# Patient Record
Sex: FEMALE | Race: WHITE | NOT HISPANIC OR LATINO | Employment: OTHER | URBAN - METROPOLITAN AREA
[De-identification: names, ages, dates, MRNs, and addresses within clinical notes are randomized per-mention and may not be internally consistent; named-entity substitution may affect disease eponyms.]

---

## 2017-03-17 DIAGNOSIS — Z13.820 SCREENING FOR OSTEOPOROSIS: ICD-10-CM

## 2017-03-17 DIAGNOSIS — Z12.31 ENCOUNTER FOR SCREENING MAMMOGRAM FOR BREAST CANCER: Primary | ICD-10-CM

## 2017-03-20 ENCOUNTER — APPOINTMENT (OUTPATIENT)
Dept: WOMENS IMAGING | Facility: HOSPITAL | Age: 77
End: 2017-03-20

## 2017-03-20 PROCEDURE — G0202 SCR MAMMO BI INCL CAD: HCPCS | Performed by: RADIOLOGY

## 2017-10-02 RX ORDER — ATORVASTATIN CALCIUM 20 MG/1
TABLET, FILM COATED ORAL
Qty: 90 TABLET | Refills: 1 | Status: SHIPPED | OUTPATIENT
Start: 2017-10-02 | End: 2019-11-14

## 2018-02-20 ENCOUNTER — TELEPHONE (OUTPATIENT)
Dept: INTERNAL MEDICINE | Facility: CLINIC | Age: 78
End: 2018-02-20

## 2018-02-20 RX ORDER — ATENOLOL 25 MG/1
25 TABLET ORAL DAILY
Qty: 90 TABLET | Refills: 0 | Status: SHIPPED | OUTPATIENT
Start: 2018-02-20 | End: 2018-05-29 | Stop reason: SDUPTHER

## 2018-02-20 NOTE — TELEPHONE ENCOUNTER
Last ov  12/13/16  No up coming appt  Ok to refill rx ?    ----- Message from Graciela Richards sent at 2/20/2018  3:18 PM EST -----  Contact: pt - Dr Kumar' pt - RE: Rx sent to [place]  Pt calling and would like a refill on Rx      atenolol (TENORMIN) 25 MG tablet 90 tablet       Sig - Route: Take 1 tablet by mouth Daily. - Oral      [facility], [place]  # [phone]    Pt # [phone]

## 2018-05-29 RX ORDER — ATENOLOL 25 MG/1
25 TABLET ORAL DAILY
Qty: 90 TABLET | Refills: 3 | Status: SHIPPED | OUTPATIENT
Start: 2018-05-29 | End: 2019-11-14 | Stop reason: SDUPTHER

## 2019-10-08 RX ORDER — ALENDRONATE SODIUM 70 MG/1
70 TABLET ORAL
Qty: 12 TABLET | Refills: 3 | Status: SHIPPED | OUTPATIENT
Start: 2019-10-08 | End: 2019-11-14 | Stop reason: SDUPTHER

## 2019-11-14 ENCOUNTER — OFFICE VISIT (OUTPATIENT)
Dept: FAMILY MEDICINE CLINIC | Facility: CLINIC | Age: 79
End: 2019-11-14

## 2019-11-14 VITALS
SYSTOLIC BLOOD PRESSURE: 122 MMHG | TEMPERATURE: 98 F | WEIGHT: 119.6 LBS | OXYGEN SATURATION: 96 % | DIASTOLIC BLOOD PRESSURE: 72 MMHG | HEART RATE: 85 BPM | HEIGHT: 62 IN | BODY MASS INDEX: 22.01 KG/M2

## 2019-11-14 DIAGNOSIS — L30.9 ECZEMA, UNSPECIFIED TYPE: ICD-10-CM

## 2019-11-14 DIAGNOSIS — M85.80 OSTEOPENIA, UNSPECIFIED LOCATION: ICD-10-CM

## 2019-11-14 DIAGNOSIS — Z79.899 ENCOUNTER FOR LONG-TERM (CURRENT) USE OF MEDICATIONS: ICD-10-CM

## 2019-11-14 DIAGNOSIS — I10 ESSENTIAL HYPERTENSION: Primary | ICD-10-CM

## 2019-11-14 PROCEDURE — 99214 OFFICE O/P EST MOD 30 MIN: CPT | Performed by: FAMILY MEDICINE

## 2019-11-14 RX ORDER — TRIAMCINOLONE ACETONIDE 1 MG/G
CREAM TOPICAL 2 TIMES DAILY
Qty: 45 G | Refills: 1 | Status: SHIPPED | OUTPATIENT
Start: 2019-11-14 | End: 2020-01-07 | Stop reason: SDUPTHER

## 2019-11-14 RX ORDER — ALENDRONATE SODIUM 70 MG/1
70 TABLET ORAL
Qty: 12 TABLET | Refills: 3 | Status: SHIPPED | OUTPATIENT
Start: 2019-11-14 | End: 2021-04-19 | Stop reason: SDUPTHER

## 2019-11-14 RX ORDER — ATENOLOL 25 MG/1
25 TABLET ORAL DAILY
Qty: 90 TABLET | Refills: 3 | Status: SHIPPED | OUTPATIENT
Start: 2019-11-14 | End: 2021-04-15 | Stop reason: SDUPTHER

## 2019-11-14 NOTE — PATIENT INSTRUCTIONS
I will order a DEXA scan after May 2020 when the patient has been back on Fosamax for a full year.   Any medication changes will be made based on lab results and will be called to the patient later this week.    Osteoporosis    Osteoporosis is thinning and loss of density in your bones. Osteoporosis makes bones more brittle and fragile and more likely to break (fracture). Over time, osteoporosis can cause your bones to become so weak that they fracture after a minor fall. Bones in the hip, wrist, and spine are most likely to fracture due to osteoporosis.  What are the causes?  The exact cause of this condition is not known.  What increases the risk?  You may be at greater risk for osteoporosis if you:  · Have a family history of the condition.  · Have poor nutrition.  · Use steroid medicines, such as prednisone.  · Are female.  · Are age 50 or older.  · Smoke or have a history of smoking.  · Are not physically active (are sedentary).  · Are white () or of  descent.  · Have a small body frame.  · Take certain medicines, such as antiseizure medicines.  What are the signs or symptoms?  A fracture might be the first sign of osteoporosis, especially if the fracture results from a fall or injury that usually would not cause a bone to break. Other signs and symptoms include:  · Pain in the neck or low back.  · Stooped posture.  · Loss of height.  How is this diagnosed?  This condition may be diagnosed based on:  · Your medical history.  · A physical exam.  · A bone mineral density test, also called a DXA or DEXA test (dual-energy X-ray absorptiometry test). This test uses X-rays to measure the amount of minerals in your bones.  How is this treated?  The goal of treatment is to strengthen your bones and lower your risk for a fracture. Treatment may involve:  · Making lifestyle changes, such as:  ? Including foods with more calcium and vitamin D in your diet.  ? Doing weight-bearing and muscle-strengthening  exercises.  ? Stopping tobacco use.  ? Limiting alcohol intake.  · Taking medicine to slow the process of bone loss or to increase bone density.  · Taking daily supplements of calcium and vitamin D.  · Taking hormone replacement medicines, such as estrogen for women and testosterone for men.  · Monitoring your levels of calcium and vitamin D.  Follow these instructions at home:    Activity  · Exercise as told by your health care provider. Ask your health care provider what exercises and activities are safe for you. You should do:  ? Exercises that make you work against gravity (weight-bearing exercises), such as luis eduardo chi, yoga, or walking.  ? Exercises to strengthen muscles, such as lifting weights.  Lifestyle  · Limit alcohol intake to no more than 1 drink a day for nonpregnant women and 2 drinks a day for men. One drink equals 12 oz of beer, 5 oz of wine, or 1½ oz of hard liquor.  · Do not use any products that contain nicotine or tobacco, such as cigarettes and e-cigarettes. If you need help quitting, ask your health care provider.  Preventing falls  · Use devices to help you move around (mobility aids) as needed, such as canes, walkers, scooters, or crutches.  · Keep rooms well-lit and clutter-free.  · Remove tripping hazards from walkways, including cords and throw rugs.  · Install grab bars in bathrooms and safety rails on stairs.  · Use rubber mats in the bathroom and other areas that are often wet or slippery.  · Wear closed-toe shoes that fit well and support your feet. Wear shoes that have rubber soles or low heels.  · Review your medicines with your health care provider. Some medicines can cause dizziness or changes in blood pressure, which can increase your risk of falling.  General instructions  · Include calcium and vitamin D in your diet. Calcium is important for bone health, and vitamin D helps your body to absorb calcium. Good sources of calcium and vitamin D include:  ? Certain fatty fish, such as  salmon and tuna.  ? Products that have calcium and vitamin D added to them (fortified products), such as fortified cereals.  ? Egg yolks.  ? Cheese.  ? Liver.  · Take over-the-counter and prescription medicines only as told by your health care provider.  · Keep all follow-up visits as told by your health care provider. This is important.  Contact a health care provider if:  · You have never been screened for osteoporosis and you are:  ? A woman who is age 65 or older.  ? A man who is age 70 or older.  Get help right away if:  · You fall or injure yourself.  Summary  · Osteoporosis is thinning and loss of density in your bones. This makes bones more brittle and fragile and more likely to break (fracture),even with minor falls.  · The goal of treatment is to strengthen your bones and reduce your risk for a fracture.  · Include calcium and vitamin D in your diet. Calcium is important for bone health, and vitamin D helps your body to absorb calcium.  · Talk with your health care provider about screening for osteoporosis if you are a woman who is age 65 or older, or a man who is age 70 or older.  This information is not intended to replace advice given to you by your health care provider. Make sure you discuss any questions you have with your health care provider.  Document Released: 09/27/2006 Document Revised: 10/12/2018 Document Reviewed: 10/12/2018  [employer] Interactive Patient Education © 2019 [employer]

## 2019-11-14 NOTE — PROGRESS NOTES
Subjective   Jennifer Sprague is a 79 y.o. female.     Chief Complaint   Patient presents with   • Hypertension   • Osteoporosis   • Eczema       Patient is here to follow-up on hypertension and osteopenia.  6 months ago we started the patient back on Fosamax weekly.  She states that she has not had any progression of back pain.  She is feeling pretty well.  Her home blood pressure readings have been good and she is compliant with medications.  She denies any side effects.  The patient also needs a refill on her steroid cream for her fingers.  She still gets eczema.  It helps greatly.         Review of Systems   Constitutional: Negative for activity change, chills, fatigue and fever.   HENT: Negative for hearing loss, swollen glands, tinnitus and trouble swallowing.    Eyes: Negative for pain and visual disturbance.   Respiratory: Negative for cough and shortness of breath.    Cardiovascular: Negative for chest pain, palpitations and leg swelling.   Gastrointestinal: Negative for diarrhea and nausea.   Endocrine: Negative for polydipsia and polyuria.   Genitourinary: Negative for difficulty urinating and urinary incontinence.   Musculoskeletal: Negative for arthralgias, gait problem and joint swelling.   Skin: Negative for rash.   Allergic/Immunologic: Negative for immunocompromised state.   Neurological: Negative for dizziness, light-headedness and headache.   Hematological: Negative for adenopathy. Does not bruise/bleed easily.   Psychiatric/Behavioral: Negative for dysphoric mood and sleep disturbance.       The following portions of the patient's history were reviewed and updated as appropriate: allergies, current medications, past family history, past medical history, past social history, past surgical history and problem list.    Past Medical History:   Diagnosis Date   • History of colon polyps    • Hyperlipidemia    • Hypertension    • Osteopenia        Past Surgical History:   Procedure Laterality Date   •  "BACK SURGERY  1980    Disc surgery lower back, 1984   • EXPLORATORY LAPAROTOMY W/ BOWEL RESECTION  05/11/2016    Exp. Lap for SBO - had SB resection, adhesiolysis       Family History   Problem Relation Age of Onset   • Lupus Mother        Social History     Socioeconomic History   • Marital status: Unknown     Spouse name: Not on file   • Number of children: Not on file   • Years of education: Not on file   • Highest education level: Not on file   Tobacco Use   • Smoking status: Former Smoker   Substance and Sexual Activity   • Alcohol use: Yes         Current Outpatient Medications:   •  alendronate (FOSAMAX) 70 MG tablet, Take 1 tablet by mouth Every 7 (Seven) Days., Disp: 12 tablet, Rfl: 3  •  atenolol (TENORMIN) 25 MG tablet, Take 1 tablet by mouth Daily., Disp: 90 tablet, Rfl: 3  •  Multiple Vitamins-Minerals (MULTIVITAMIN ADULT PO), Take  by mouth., Disp: , Rfl:   •  triamcinolone (KENALOG) 0.1 % cream, Apply  topically to the appropriate area as directed 2 (Two) Times a Day., Disp: 45 g, Rfl: 1    Objective     Vitals:    11/14/19 0952   BP: 122/72   Pulse: 85   Temp: 98 °F (36.7 °C)   SpO2: 96%   Weight: 54.3 kg (119 lb 9.6 oz)   Height: 157.5 cm (62\")       Body mass index is 21.88 kg/m².    No components found for: 2D    Physical Exam   Constitutional: She is oriented to person, place, and time. She appears well-developed and well-nourished.   HENT:   Head: Normocephalic and atraumatic.   Eyes: Conjunctivae are normal.   Neck: Normal range of motion. Neck supple.   Cardiovascular: Normal rate, regular rhythm, normal heart sounds and intact distal pulses.   Pulmonary/Chest: Effort normal and breath sounds normal.   Abdominal: Soft. Bowel sounds are normal.   Musculoskeletal: Normal range of motion. She exhibits no edema.   Neurological: She is alert and oriented to person, place, and time.   Skin: Skin is warm and dry. Capillary refill takes less than 2 seconds. No rash noted.   Psychiatric: She has a " normal mood and affect. Her behavior is normal. Judgment and thought content normal.   Nursing note and vitals reviewed.      Procedures    Assessment/Plan   Jennifer was seen today for hypertension, osteoporosis and eczema.    Diagnoses and all orders for this visit:    Essential hypertension  -     Comprehensive Metabolic Panel  -     atenolol (TENORMIN) 25 MG tablet; Take 1 tablet by mouth Daily.    Osteopenia, unspecified location  -     Comprehensive Metabolic Panel  -     alendronate (FOSAMAX) 70 MG tablet; Take 1 tablet by mouth Every 7 (Seven) Days.    Encounter for long-term (current) use of medications  -     CBC & Differential  -     Comprehensive Metabolic Panel  -     TSH    Eczema, unspecified type  -     triamcinolone (KENALOG) 0.1 % cream; Apply  topically to the appropriate area as directed 2 (Two) Times a Day.        Patient Instructions   I will order a DEXA scan after May 2020 when the patient has been back on Fosamax for a full year.   Any medication changes will be made based on lab results and will be called to the patient later this week.    Osteoporosis    Osteoporosis is thinning and loss of density in your bones. Osteoporosis makes bones more brittle and fragile and more likely to break (fracture). Over time, osteoporosis can cause your bones to become so weak that they fracture after a minor fall. Bones in the hip, wrist, and spine are most likely to fracture due to osteoporosis.  What are the causes?  The exact cause of this condition is not known.  What increases the risk?  You may be at greater risk for osteoporosis if you:  · Have a family history of the condition.  · Have poor nutrition.  · Use steroid medicines, such as prednisone.  · Are female.  · Are age 50 or older.  · Smoke or have a history of smoking.  · Are not physically active (are sedentary).  · Are white () or of  descent.  · Have a small body frame.  · Take certain medicines, such as antiseizure  medicines.  What are the signs or symptoms?  A fracture might be the first sign of osteoporosis, especially if the fracture results from a fall or injury that usually would not cause a bone to break. Other signs and symptoms include:  · Pain in the neck or low back.  · Stooped posture.  · Loss of height.  How is this diagnosed?  This condition may be diagnosed based on:  · Your medical history.  · A physical exam.  · A bone mineral density test, also called a DXA or DEXA test (dual-energy X-ray absorptiometry test). This test uses X-rays to measure the amount of minerals in your bones.  How is this treated?  The goal of treatment is to strengthen your bones and lower your risk for a fracture. Treatment may involve:  · Making lifestyle changes, such as:  ? Including foods with more calcium and vitamin D in your diet.  ? Doing weight-bearing and muscle-strengthening exercises.  ? Stopping tobacco use.  ? Limiting alcohol intake.  · Taking medicine to slow the process of bone loss or to increase bone density.  · Taking daily supplements of calcium and vitamin D.  · Taking hormone replacement medicines, such as estrogen for women and testosterone for men.  · Monitoring your levels of calcium and vitamin D.  Follow these instructions at home:    Activity  · Exercise as told by your health care provider. Ask your health care provider what exercises and activities are safe for you. You should do:  ? Exercises that make you work against gravity (weight-bearing exercises), such as luis eduardo chi, yoga, or walking.  ? Exercises to strengthen muscles, such as lifting weights.  Lifestyle  · Limit alcohol intake to no more than 1 drink a day for nonpregnant women and 2 drinks a day for men. One drink equals 12 oz of beer, 5 oz of wine, or 1½ oz of hard liquor.  · Do not use any products that contain nicotine or tobacco, such as cigarettes and e-cigarettes. If you need help quitting, ask your health care provider.  Preventing  falls  · Use devices to help you move around (mobility aids) as needed, such as canes, walkers, scooters, or crutches.  · Keep rooms well-lit and clutter-free.  · Remove tripping hazards from walkways, including cords and throw rugs.  · Install grab bars in bathrooms and safety rails on stairs.  · Use rubber mats in the bathroom and other areas that are often wet or slippery.  · Wear closed-toe shoes that fit well and support your feet. Wear shoes that have rubber soles or low heels.  · Review your medicines with your health care provider. Some medicines can cause dizziness or changes in blood pressure, which can increase your risk of falling.  General instructions  · Include calcium and vitamin D in your diet. Calcium is important for bone health, and vitamin D helps your body to absorb calcium. Good sources of calcium and vitamin D include:  ? Certain fatty fish, such as salmon and tuna.  ? Products that have calcium and vitamin D added to them (fortified products), such as fortified cereals.  ? Egg yolks.  ? Cheese.  ? Liver.  · Take over-the-counter and prescription medicines only as told by your health care provider.  · Keep all follow-up visits as told by your health care provider. This is important.  Contact a health care provider if:  · You have never been screened for osteoporosis and you are:  ? A woman who is age 65 or older.  ? A man who is age 70 or older.  Get help right away if:  · You fall or injure yourself.  Summary  · Osteoporosis is thinning and loss of density in your bones. This makes bones more brittle and fragile and more likely to break (fracture),even with minor falls.  · The goal of treatment is to strengthen your bones and reduce your risk for a fracture.  · Include calcium and vitamin D in your diet. Calcium is important for bone health, and vitamin D helps your body to absorb calcium.  · Talk with your health care provider about screening for osteoporosis if you are a woman who is age  65 or older, or a man who is age 70 or older.  This information is not intended to replace advice given to you by your health care provider. Make sure you discuss any questions you have with your health care provider.  Document Released: 09/27/2006 Document Revised: 10/12/2018 Document Reviewed: 10/12/2018  [employer] Interactive Patient Education © 2019 [employer] Inc.

## 2019-11-15 LAB
ALBUMIN SERPL-MCNC: 4.5 G/DL (ref 3.5–5.2)
ALBUMIN/GLOB SERPL: 2 G/DL
ALP SERPL-CCNC: 96 U/L (ref 39–117)
ALT SERPL-CCNC: 12 U/L (ref 1–33)
AST SERPL-CCNC: 17 U/L (ref 1–32)
BASOPHILS # BLD AUTO: 0.02 10*3/MM3 (ref 0–0.2)
BASOPHILS NFR BLD AUTO: 0.4 % (ref 0–1.5)
BILIRUB SERPL-MCNC: 0.5 MG/DL (ref 0.2–1.2)
BUN SERPL-MCNC: 14 MG/DL (ref 8–23)
BUN/CREAT SERPL: 17.1 (ref 7–25)
CALCIUM SERPL-MCNC: 9.4 MG/DL (ref 8.6–10.5)
CHLORIDE SERPL-SCNC: 101 MMOL/L (ref 98–107)
CO2 SERPL-SCNC: 26.4 MMOL/L (ref 22–29)
CREAT SERPL-MCNC: 0.82 MG/DL (ref 0.57–1)
EOSINOPHIL # BLD AUTO: 0.04 10*3/MM3 (ref 0–0.4)
EOSINOPHIL NFR BLD AUTO: 0.8 % (ref 0.3–6.2)
ERYTHROCYTE [DISTWIDTH] IN BLOOD BY AUTOMATED COUNT: 12.8 % (ref 12.3–15.4)
GLOBULIN SER CALC-MCNC: 2.2 GM/DL
GLUCOSE SERPL-MCNC: 97 MG/DL (ref 65–99)
HCT VFR BLD AUTO: 40.3 % (ref 34–46.6)
HGB BLD-MCNC: 13.7 G/DL (ref 12–15.9)
IMM GRANULOCYTES # BLD AUTO: 0.01 10*3/MM3 (ref 0–0.05)
IMM GRANULOCYTES NFR BLD AUTO: 0.2 % (ref 0–0.5)
LYMPHOCYTES # BLD AUTO: 2.26 10*3/MM3 (ref 0.7–3.1)
LYMPHOCYTES NFR BLD AUTO: 42.6 % (ref 19.6–45.3)
MCH RBC QN AUTO: 31.1 PG (ref 26.6–33)
MCHC RBC AUTO-ENTMCNC: 34 G/DL (ref 31.5–35.7)
MCV RBC AUTO: 91.4 FL (ref 79–97)
MONOCYTES # BLD AUTO: 0.42 10*3/MM3 (ref 0.1–0.9)
MONOCYTES NFR BLD AUTO: 7.9 % (ref 5–12)
NEUTROPHILS # BLD AUTO: 2.56 10*3/MM3 (ref 1.7–7)
NEUTROPHILS NFR BLD AUTO: 48.1 % (ref 42.7–76)
NRBC BLD AUTO-RTO: 0 /100 WBC (ref 0–0.2)
PLATELET # BLD AUTO: 267 10*3/MM3 (ref 140–450)
POTASSIUM SERPL-SCNC: 4.9 MMOL/L (ref 3.5–5.2)
PROT SERPL-MCNC: 6.7 G/DL (ref 6–8.5)
RBC # BLD AUTO: 4.41 10*6/MM3 (ref 3.77–5.28)
SODIUM SERPL-SCNC: 141 MMOL/L (ref 136–145)
TSH SERPL DL<=0.005 MIU/L-ACNC: 2.22 UIU/ML (ref 0.27–4.2)
WBC # BLD AUTO: 5.31 10*3/MM3 (ref 3.4–10.8)

## 2020-01-06 DIAGNOSIS — L30.9 ECZEMA, UNSPECIFIED TYPE: ICD-10-CM

## 2020-01-07 RX ORDER — TRIAMCINOLONE ACETONIDE 1 MG/G
CREAM TOPICAL 2 TIMES DAILY
Qty: 45 G | Refills: 1 | Status: SHIPPED | OUTPATIENT
Start: 2020-01-07 | End: 2020-09-22

## 2020-01-10 DIAGNOSIS — L30.9 ECZEMA, UNSPECIFIED TYPE: ICD-10-CM

## 2020-01-10 RX ORDER — TRIAMCINOLONE ACETONIDE 1 MG/G
CREAM TOPICAL
Qty: 45 G | Refills: 1 | OUTPATIENT
Start: 2020-01-10

## 2020-07-21 ENCOUNTER — OFFICE VISIT (OUTPATIENT)
Dept: FAMILY MEDICINE CLINIC | Facility: CLINIC | Age: 80
End: 2020-07-21

## 2020-07-21 VITALS
HEIGHT: 62 IN | DIASTOLIC BLOOD PRESSURE: 80 MMHG | TEMPERATURE: 97.7 F | BODY MASS INDEX: 22.26 KG/M2 | OXYGEN SATURATION: 97 % | WEIGHT: 121 LBS | HEART RATE: 84 BPM | SYSTOLIC BLOOD PRESSURE: 138 MMHG

## 2020-07-21 DIAGNOSIS — I10 ESSENTIAL HYPERTENSION: Primary | ICD-10-CM

## 2020-07-21 DIAGNOSIS — R41.3 MEMORY DIFFICULTIES: ICD-10-CM

## 2020-07-21 DIAGNOSIS — Z79.899 ENCOUNTER FOR LONG-TERM (CURRENT) USE OF MEDICATIONS: ICD-10-CM

## 2020-07-21 DIAGNOSIS — G31.84 MILD COGNITIVE IMPAIRMENT: ICD-10-CM

## 2020-07-21 DIAGNOSIS — R53.83 FATIGUE, UNSPECIFIED TYPE: ICD-10-CM

## 2020-07-21 PROCEDURE — 99214 OFFICE O/P EST MOD 30 MIN: CPT | Performed by: FAMILY MEDICINE

## 2020-07-21 NOTE — PROGRESS NOTES
Subjective   Jennifer Sprague is a 79 y.o. female.     Chief Complaint   Patient presents with   • Hypertension     FOLLOW UP       Patient is here to follow-up on hypertension and osteopenia.  12 months ago we started the patient back on Fosamax weekly.  She states that she has not had any progression of back pain.  She is feeling pretty well.  Her home blood pressure readings have been good and she is compliant with medications.  She denies any side effects.  Patient is also here with her daughter today who is concerned about the patient's memory.  Her daughter states that she and the patient's  have noticed a decline in the patient's memory.  The patient will repeat herself frequently throughout the day.  The patient is unaware of this.  This is been going on for the past couple of years.  It has been gradually worsening over time.  The patient's daughter states that there was no specific event that triggered the start of her memory issues it is just something that has gradually developed.  The daughter states that patient seems to be tired often and does sleep a lot.  The patient states she does not have any trouble with sleep during the night and sleeps well.  The daughter states that as far she knows her mother does sleep well but she will nap throughout the day also.       Review of Systems   Constitutional: Negative for activity change, chills, fatigue and fever.   HENT: Negative for hearing loss, swollen glands, tinnitus and trouble swallowing.    Eyes: Negative for pain and visual disturbance.   Respiratory: Negative for cough and shortness of breath.    Cardiovascular: Negative for chest pain, palpitations and leg swelling.   Gastrointestinal: Negative for diarrhea and nausea.   Endocrine: Negative for polydipsia and polyuria.   Genitourinary: Negative for difficulty urinating and urinary incontinence.   Musculoskeletal: Negative for arthralgias, gait problem and joint swelling.   Skin: Negative  "for rash.   Allergic/Immunologic: Negative for immunocompromised state.   Neurological: Negative for dizziness, light-headedness and headache.   Hematological: Negative for adenopathy. Does not bruise/bleed easily.   Psychiatric/Behavioral: Negative for dysphoric mood and sleep disturbance.       The following portions of the patient's history were reviewed and updated as appropriate: allergies, current medications, past family history, past medical history, past social history, past surgical history and problem list.    Past Medical History:   Diagnosis Date   • History of colon polyps    • Hyperlipidemia    • Hypertension    • Osteopenia        Past Surgical History:   Procedure Laterality Date   • BACK SURGERY  1980    Disc surgery lower back, 1984   • EXPLORATORY LAPAROTOMY W/ BOWEL RESECTION  05/11/2016    Exp. Lap for SBO - had SB resection, adhesiolysis       Family History   Problem Relation Age of Onset   • Lupus Mother        Social History     Socioeconomic History   • Marital status: Unknown     Spouse name: Not on file   • Number of children: Not on file   • Years of education: Not on file   • Highest education level: Not on file   Tobacco Use   • Smoking status: Former Smoker   • Smokeless tobacco: Never Used   Substance and Sexual Activity   • Alcohol use: Yes         Current Outpatient Medications:   •  alendronate (FOSAMAX) 70 MG tablet, Take 1 tablet by mouth Every 7 (Seven) Days., Disp: 12 tablet, Rfl: 3  •  atenolol (TENORMIN) 25 MG tablet, Take 1 tablet by mouth Daily., Disp: 90 tablet, Rfl: 3  •  Multiple Vitamins-Minerals (MULTIVITAMIN ADULT PO), Take  by mouth., Disp: , Rfl:   •  triamcinolone (KENALOG) 0.1 % cream, Apply  topically to the appropriate area as directed 2 (Two) Times a Day., Disp: 45 g, Rfl: 1    Objective     Vitals:    07/21/20 1350   BP: 138/80   Pulse: 84   Temp: 97.7 °F (36.5 °C)   SpO2: 97%   Weight: 54.9 kg (121 lb)   Height: 157.5 cm (62\")       Body mass index is 22.13 " kg/m².    No components found for: 2D    Physical Exam   Constitutional: She is oriented to person, place, and time. She appears well-developed and well-nourished.   HENT:   Head: Normocephalic and atraumatic.   Eyes: Conjunctivae are normal.   Neck: Normal range of motion. Neck supple.   Cardiovascular: Normal rate, regular rhythm, normal heart sounds and intact distal pulses.   Pulmonary/Chest: Effort normal and breath sounds normal.   Abdominal: Soft. Bowel sounds are normal.   Musculoskeletal: Normal range of motion. She exhibits no edema.   Neurological: She is alert and oriented to person, place, and time.   Skin: Skin is warm and dry. Capillary refill takes less than 2 seconds. No rash noted.   Psychiatric: She has a normal mood and affect. Her behavior is normal. Judgment and thought content normal.   Nursing note and vitals reviewed.    ACE III Mini score:  1/30    Procedures    Assessment/Plan   Jennifer was seen today for hypertension.    Diagnoses and all orders for this visit:    Essential hypertension    Encounter for long-term (current) use of medications  -     Comprehensive Metabolic Panel  -     CBC & Differential  -     TSH    Mild cognitive impairment  -     MRI Brain Without Contrast; Future    Memory difficulties  -     MRI Brain Without Contrast; Future    Fatigue, unspecified type  -     MRI Brain Without Contrast; Future    I will check labs and an MRI to see if there is any other explanation for the patient's memory issues but it appears with the gradual progression it could be Alzheimer's type dementia.  The patient will follow-up to discuss the results.    There are no Patient Instructions on file for this visit.

## 2020-07-22 LAB
ALBUMIN SERPL-MCNC: 4.1 G/DL (ref 3.7–4.7)
ALBUMIN/GLOB SERPL: 1.6 {RATIO} (ref 1.2–2.2)
ALP SERPL-CCNC: 93 IU/L (ref 39–117)
ALT SERPL-CCNC: 7 IU/L (ref 0–32)
AST SERPL-CCNC: 13 IU/L (ref 0–40)
BASOPHILS # BLD AUTO: 0 X10E3/UL (ref 0–0.2)
BASOPHILS NFR BLD AUTO: 0 %
BILIRUB SERPL-MCNC: 0.3 MG/DL (ref 0–1.2)
BUN SERPL-MCNC: 12 MG/DL (ref 8–27)
BUN/CREAT SERPL: 15 (ref 12–28)
CALCIUM SERPL-MCNC: 9.2 MG/DL (ref 8.7–10.3)
CHLORIDE SERPL-SCNC: 101 MMOL/L (ref 96–106)
CO2 SERPL-SCNC: 21 MMOL/L (ref 20–29)
CREAT SERPL-MCNC: 0.78 MG/DL (ref 0.57–1)
EOSINOPHIL # BLD AUTO: 0 X10E3/UL (ref 0–0.4)
EOSINOPHIL NFR BLD AUTO: 0 %
ERYTHROCYTE [DISTWIDTH] IN BLOOD BY AUTOMATED COUNT: 12 % (ref 11.7–15.4)
GLOBULIN SER CALC-MCNC: 2.5 G/DL (ref 1.5–4.5)
GLUCOSE SERPL-MCNC: 91 MG/DL (ref 65–99)
HCT VFR BLD AUTO: 40.4 % (ref 34–46.6)
HGB BLD-MCNC: 12.9 G/DL (ref 11.1–15.9)
IMM GRANULOCYTES # BLD AUTO: 0 X10E3/UL (ref 0–0.1)
IMM GRANULOCYTES NFR BLD AUTO: 0 %
LYMPHOCYTES # BLD AUTO: 2.1 X10E3/UL (ref 0.7–3.1)
LYMPHOCYTES NFR BLD AUTO: 23 %
MCH RBC QN AUTO: 30.4 PG (ref 26.6–33)
MCHC RBC AUTO-ENTMCNC: 31.9 G/DL (ref 31.5–35.7)
MCV RBC AUTO: 95 FL (ref 79–97)
MONOCYTES # BLD AUTO: 0.7 X10E3/UL (ref 0.1–0.9)
MONOCYTES NFR BLD AUTO: 8 %
NEUTROPHILS # BLD AUTO: 6.2 X10E3/UL (ref 1.4–7)
NEUTROPHILS NFR BLD AUTO: 69 %
PLATELET # BLD AUTO: 284 X10E3/UL (ref 150–450)
POTASSIUM SERPL-SCNC: 4.7 MMOL/L (ref 3.5–5.2)
PROT SERPL-MCNC: 6.6 G/DL (ref 6–8.5)
RBC # BLD AUTO: 4.24 X10E6/UL (ref 3.77–5.28)
SODIUM SERPL-SCNC: 137 MMOL/L (ref 134–144)
TSH SERPL DL<=0.005 MIU/L-ACNC: 1.7 UIU/ML (ref 0.45–4.5)
WBC # BLD AUTO: 9.1 X10E3/UL (ref 3.4–10.8)

## 2020-08-13 ENCOUNTER — HOSPITAL ENCOUNTER (OUTPATIENT)
Dept: MRI IMAGING | Facility: HOSPITAL | Age: 80
Discharge: HOME | End: 2020-08-13
Admitting: FAMILY MEDICINE

## 2020-08-13 DIAGNOSIS — R41.3 MEMORY DIFFICULTIES: ICD-10-CM

## 2020-08-13 DIAGNOSIS — R53.83 FATIGUE, UNSPECIFIED TYPE: ICD-10-CM

## 2020-08-13 DIAGNOSIS — G31.84 MILD COGNITIVE IMPAIRMENT: ICD-10-CM

## 2020-08-13 PROCEDURE — 70551 MRI BRAIN STEM W/O DYE: CPT

## 2020-08-20 ENCOUNTER — OFFICE VISIT (OUTPATIENT)
Dept: FAMILY MEDICINE CLINIC | Facility: CLINIC | Age: 80
End: 2020-08-20

## 2020-08-20 VITALS
TEMPERATURE: 96.8 F | HEART RATE: 72 BPM | HEIGHT: 62 IN | BODY MASS INDEX: 21.83 KG/M2 | SYSTOLIC BLOOD PRESSURE: 152 MMHG | WEIGHT: 118.6 LBS | DIASTOLIC BLOOD PRESSURE: 86 MMHG | OXYGEN SATURATION: 100 %

## 2020-08-20 DIAGNOSIS — G31.84 MILD COGNITIVE IMPAIRMENT: Primary | ICD-10-CM

## 2020-08-20 PROCEDURE — 99213 OFFICE O/P EST LOW 20 MIN: CPT | Performed by: FAMILY MEDICINE

## 2020-08-20 RX ORDER — DONEPEZIL HYDROCHLORIDE 5 MG/1
5 TABLET, FILM COATED ORAL NIGHTLY
Qty: 30 TABLET | Refills: 1 | Status: SHIPPED | OUTPATIENT
Start: 2020-08-20 | End: 2020-09-30 | Stop reason: SDUPTHER

## 2020-08-20 NOTE — PROGRESS NOTES
Subjective   Jennifer Sprague is a 80 y.o. female.     Chief Complaint   Patient presents with   • Follow-up   • Results     MRI follow up       Patient is here today to follow-up after MRI.  At her last visit we discussed her memory problems.  She is here today with her daughter.  The MRI revealed chronic small vessel changes but no other acute findings.  The patient denies any headaches or ear pain.  She is willing to start medication for her memory.  I also reviewed normal lab findings from July 21, 2020 with her and her daughter.  Therefore it is determined that there is no other cause for her memory issues.         Review of Systems   Constitutional: Negative for activity change, chills, fatigue and fever.   HENT: Negative for hearing loss, swollen glands, tinnitus and trouble swallowing.    Eyes: Negative for pain and visual disturbance.   Respiratory: Negative for cough and shortness of breath.    Cardiovascular: Negative for chest pain, palpitations and leg swelling.   Gastrointestinal: Negative for diarrhea and nausea.   Endocrine: Negative for polydipsia and polyuria.   Genitourinary: Negative for difficulty urinating and urinary incontinence.   Musculoskeletal: Negative for arthralgias, gait problem and joint swelling.   Skin: Negative for rash.   Allergic/Immunologic: Negative for immunocompromised state.   Neurological: Negative for dizziness, light-headedness and headache.   Hematological: Negative for adenopathy. Does not bruise/bleed easily.   Psychiatric/Behavioral: Negative for dysphoric mood and sleep disturbance.       The following portions of the patient's history were reviewed and updated as appropriate: allergies, current medications, past family history, past medical history, past social history, past surgical history and problem list.    Past Medical History:   Diagnosis Date   • History of colon polyps    • Hyperlipidemia    • Hypertension    • Osteopenia        Past Surgical History:  "  Procedure Laterality Date   • BACK SURGERY  1980    Disc surgery lower back, 1984   • EXPLORATORY LAPAROTOMY W/ BOWEL RESECTION  05/11/2016    Exp. Lap for SBO - had SB resection, adhesiolysis       Family History   Problem Relation Age of Onset   • Lupus Mother        Social History     Socioeconomic History   • Marital status: Unknown     Spouse name: Not on file   • Number of children: Not on file   • Years of education: Not on file   • Highest education level: Not on file   Tobacco Use   • Smoking status: Former Smoker   • Smokeless tobacco: Never Used   Substance and Sexual Activity   • Alcohol use: Yes         Current Outpatient Medications:   •  alendronate (FOSAMAX) 70 MG tablet, Take 1 tablet by mouth Every 7 (Seven) Days., Disp: 12 tablet, Rfl: 3  •  atenolol (TENORMIN) 25 MG tablet, Take 1 tablet by mouth Daily., Disp: 90 tablet, Rfl: 3  •  Multiple Vitamins-Minerals (MULTIVITAMIN ADULT PO), Take  by mouth., Disp: , Rfl:   •  triamcinolone (KENALOG) 0.1 % cream, Apply  topically to the appropriate area as directed 2 (Two) Times a Day., Disp: 45 g, Rfl: 1  •  donepezil (Aricept) 5 MG tablet, Take 1 tablet by mouth Every Night., Disp: 30 tablet, Rfl: 1    Objective     Vitals:    08/20/20 1321   BP: 152/86   Pulse: 72   Temp: 96.8 °F (36 °C)   SpO2: 100%   Weight: 53.8 kg (118 lb 9.6 oz)   Height: 157.5 cm (62\")       Body mass index is 21.69 kg/m².    No components found for: 2D    Physical Exam   Constitutional: She is oriented to person, place, and time. She appears well-developed and well-nourished.   HENT:   Head: Normocephalic and atraumatic.   Eyes: Conjunctivae are normal.   Neck: Normal range of motion. Neck supple.   Cardiovascular: Normal rate, regular rhythm, normal heart sounds and intact distal pulses.   Pulmonary/Chest: Effort normal and breath sounds normal.   Musculoskeletal: Normal range of motion. She exhibits no edema.   Neurological: She is alert and oriented to person, place, and " time.   Skin: Skin is warm and dry. Capillary refill takes less than 2 seconds. No rash noted.   Psychiatric: She has a normal mood and affect. Her behavior is normal.   Nursing note and vitals reviewed.      Procedures    Assessment/Plan   Jennifer was seen today for follow-up and results.    Diagnoses and all orders for this visit:    Mild cognitive impairment  -     donepezil (Aricept) 5 MG tablet; Take 1 tablet by mouth Every Night.    We discussed medications used to treat Alzheimer's dementia do not tend to work as well with vascular dementia but the patient and her daughter feel that it is worth a try.  We discussed side effects of nausea and vomiting.  They were advised to contact me if they had any other concerns.  The patient understands to take donepezil with food.  I advised the patient to contact me in 4 weeks.  At that time if she is tolerating the medication well, we will increase it to the maximum of 10 mg daily.  And then she will follow-up in November.  There are no Patient Instructions on file for this visit.

## 2020-09-22 DIAGNOSIS — L30.9 ECZEMA, UNSPECIFIED TYPE: ICD-10-CM

## 2020-09-22 RX ORDER — TRIAMCINOLONE ACETONIDE 1 MG/G
CREAM TOPICAL
Qty: 45 G | Refills: 1 | Status: SHIPPED | OUTPATIENT
Start: 2020-09-22 | End: 2021-04-19

## 2020-09-30 ENCOUNTER — OFFICE VISIT (OUTPATIENT)
Dept: FAMILY MEDICINE CLINIC | Facility: CLINIC | Age: 80
End: 2020-09-30

## 2020-09-30 VITALS
SYSTOLIC BLOOD PRESSURE: 116 MMHG | TEMPERATURE: 96.9 F | BODY MASS INDEX: 20.94 KG/M2 | HEART RATE: 69 BPM | DIASTOLIC BLOOD PRESSURE: 78 MMHG | WEIGHT: 113.8 LBS | OXYGEN SATURATION: 99 % | HEIGHT: 62 IN

## 2020-09-30 DIAGNOSIS — H61.21 EXCESSIVE CERUMEN IN RIGHT EAR CANAL: Primary | ICD-10-CM

## 2020-09-30 DIAGNOSIS — J30.1 SEASONAL ALLERGIC RHINITIS DUE TO POLLEN: ICD-10-CM

## 2020-09-30 DIAGNOSIS — G31.84 MILD COGNITIVE IMPAIRMENT: ICD-10-CM

## 2020-09-30 PROCEDURE — 99213 OFFICE O/P EST LOW 20 MIN: CPT | Performed by: FAMILY MEDICINE

## 2020-09-30 PROCEDURE — 69210 REMOVE IMPACTED EAR WAX UNI: CPT | Performed by: FAMILY MEDICINE

## 2020-09-30 RX ORDER — DONEPEZIL HYDROCHLORIDE 10 MG/1
10 TABLET, FILM COATED ORAL NIGHTLY
Qty: 90 TABLET | Refills: 1 | Status: SHIPPED | OUTPATIENT
Start: 2020-09-30 | End: 2021-04-19 | Stop reason: SDUPTHER

## 2020-09-30 NOTE — PROGRESS NOTES
Subjective   Jennifer Sprague is a 80 y.o. female.     Chief Complaint   Patient presents with   • Ear Fullness     Can't hear out of right ear   • Sinus Issues     Running Nose       Patient is here today with a new problem.  She states for the last couple of months she has been having some right ear fullness.  She denies any ear pain or discharge.  She denies any ringing in her ears.  She states that her hearing in the right ear is muffled.    Also the patient has had a another problem.  She has been having some clear nasal discharge for the last couple of weeks intermittently.  She has not taken any over-the-counter medications for this.  She denies any sore throat, fever, cough, or sinus pressure.    She is also here to follow-up on mild cognitive impairment.  We started the patient on donepezil 5 mg daily about a month ago.  She has not had any side effects of the medication and would be willing to increase it at this time.  She has not really noticed any change in her memory.         Review of Systems   Constitutional: Negative for activity change, chills, fatigue and fever.   HENT: Positive for rhinorrhea. Negative for hearing loss, swollen glands, tinnitus and trouble swallowing.         Ear fullness R>>L   Eyes: Negative for pain and visual disturbance.   Respiratory: Negative for cough and shortness of breath.    Cardiovascular: Negative for chest pain, palpitations and leg swelling.   Gastrointestinal: Negative for diarrhea and nausea.   Endocrine: Negative for polydipsia and polyuria.   Genitourinary: Negative for difficulty urinating and urinary incontinence.   Musculoskeletal: Negative for arthralgias, gait problem and joint swelling.   Skin: Negative for rash.   Allergic/Immunologic: Negative for immunocompromised state.   Neurological: Negative for dizziness, light-headedness and headache.   Hematological: Negative for adenopathy. Does not bruise/bleed easily.   Psychiatric/Behavioral: Negative for  "dysphoric mood and sleep disturbance.       The following portions of the patient's history were reviewed and updated as appropriate: allergies, current medications, past family history, past medical history, past social history, past surgical history and problem list.    Past Medical History:   Diagnosis Date   • History of colon polyps    • Hyperlipidemia    • Hypertension    • Osteopenia        Past Surgical History:   Procedure Laterality Date   • BACK SURGERY  1980    Disc surgery lower back, 1984   • EXPLORATORY LAPAROTOMY W/ BOWEL RESECTION  05/11/2016    Exp. Lap for SBO - had SB resection, adhesiolysis       Family History   Problem Relation Age of Onset   • Lupus Mother        Social History     Socioeconomic History   • Marital status: Unknown     Spouse name: Not on file   • Number of children: Not on file   • Years of education: Not on file   • Highest education level: Not on file   Tobacco Use   • Smoking status: Former Smoker   • Smokeless tobacco: Never Used   Substance and Sexual Activity   • Alcohol use: Yes         Current Outpatient Medications:   •  alendronate (FOSAMAX) 70 MG tablet, Take 1 tablet by mouth Every 7 (Seven) Days., Disp: 12 tablet, Rfl: 3  •  atenolol (TENORMIN) 25 MG tablet, Take 1 tablet by mouth Daily., Disp: 90 tablet, Rfl: 3  •  donepezil (ARICEPT) 10 MG tablet, Take 1 tablet by mouth Every Night., Disp: 90 tablet, Rfl: 1  •  Multiple Vitamins-Minerals (MULTIVITAMIN ADULT PO), Take  by mouth., Disp: , Rfl:   •  triamcinolone (KENALOG) 0.1 % cream, APPLY TO THE AFFECTED AREAS OF SKIN TWO TIMES A DAY, Disp: 45 g, Rfl: 1    Objective     Vitals:    09/30/20 1111   BP: 116/78   Pulse: 69   Temp: 96.9 °F (36.1 °C)   SpO2: 99%   Weight: 51.6 kg (113 lb 12.8 oz)   Height: 157.5 cm (62\")       Body mass index is 20.81 kg/m².    No components found for: 2D    Physical Exam  Vitals signs and nursing note reviewed.   Constitutional:       Appearance: Normal appearance. She is " well-developed.   HENT:      Head: Normocephalic and atraumatic.      Right Ear: External ear normal.      Left Ear: External ear normal.      Nose: Rhinorrhea (Clear rhinorrhea) present.   Eyes:      General: No scleral icterus.     Conjunctiva/sclera: Conjunctivae normal.   Neck:      Musculoskeletal: Normal range of motion and neck supple.   Cardiovascular:      Rate and Rhythm: Normal rate and regular rhythm.      Pulses: Normal pulses.      Heart sounds: Normal heart sounds. No murmur.   Pulmonary:      Effort: Pulmonary effort is normal.      Breath sounds: Normal breath sounds.   Musculoskeletal:      Right lower leg: No edema.      Left lower leg: No edema.   Lymphadenopathy:      Cervical: No cervical adenopathy.   Skin:     General: Skin is warm and dry.      Findings: No rash.   Neurological:      General: No focal deficit present.      Mental Status: She is alert and oriented to person, place, and time.   Psychiatric:         Mood and Affect: Mood normal.         Behavior: Behavior normal.         Thought Content: Thought content normal.         Judgment: Judgment normal.         Ear Cerumen Removal    Date/Time: 9/30/2020 12:23 PM  Performed by: Aimee Duque DO  Authorized by: Aimee Duque DO     Anesthesia:  Local Anesthetic: none  Ceruminolytics applied: Ceruminolytics applied prior to the procedure.  Location details: right ear  Patient tolerance: patient tolerated the procedure well with no immediate complications  Procedure type: instrumentation and irrigation   Sedation:  Patient sedated: no            Assessment/Plan   Jennifer was seen today for ear fullness and sinus issues.    Diagnoses and all orders for this visit:    Excessive cerumen in right ear canal  -     Ear Cerumen Removal    Seasonal allergic rhinitis due to pollen    Mild cognitive impairment  -     donepezil (ARICEPT) 10 MG tablet; Take 1 tablet by mouth Every Night.    I advised the patient to start Claritin 10 mg  over-the-counter for her runny nose.  I believe that her runny nose is due to allergies.    After cleaning out a good amount of cerumen the patient stated her symptoms were completely gone.  Patient should follow-up with me if she has any return of trouble with her hearing.    I will increase the Aricept to 10 mg daily.  The patient has a follow-up appointment in November and I advised her to keep that.  There are no Patient Instructions on file for this visit.

## 2021-03-02 DIAGNOSIS — Z23 IMMUNIZATION DUE: ICD-10-CM

## 2021-04-07 DIAGNOSIS — L30.9 ECZEMA, UNSPECIFIED TYPE: ICD-10-CM

## 2021-04-12 ENCOUNTER — TELEPHONE (OUTPATIENT)
Dept: FAMILY MEDICINE CLINIC | Facility: CLINIC | Age: 81
End: 2021-04-12

## 2021-04-12 DIAGNOSIS — L30.9 ECZEMA, UNSPECIFIED TYPE: ICD-10-CM

## 2021-04-12 RX ORDER — TRIAMCINOLONE ACETONIDE 1 MG/G
CREAM TOPICAL
Qty: 45 G | Refills: 1 | OUTPATIENT
Start: 2021-04-12

## 2021-04-12 NOTE — TELEPHONE ENCOUNTER
[facility] - [place], [place] - [address] - [phone]  - [phone]   [phone]      PHARMACY HAS BEEN TRYING TO GET REFILLS FOR KENALOG CREAM AND NEEDING REFILLS CALLED IN ASAP       triamcinolone (KENALOG) 0.1 % cream    PLEASE ADVISE

## 2021-04-12 NOTE — TELEPHONE ENCOUNTER
Patient needs an appt first. Last seen 9/20/20 and canceled her November appt. Called her na lm asking her to call and make appt, called pharm and left them a vm as well

## 2021-04-15 ENCOUNTER — TELEPHONE (OUTPATIENT)
Dept: FAMILY MEDICINE CLINIC | Facility: CLINIC | Age: 81
End: 2021-04-15

## 2021-04-15 DIAGNOSIS — I10 ESSENTIAL HYPERTENSION: ICD-10-CM

## 2021-04-15 RX ORDER — ATENOLOL 25 MG/1
25 TABLET ORAL DAILY
Qty: 90 TABLET | Refills: 3 | Status: SHIPPED | OUTPATIENT
Start: 2021-04-15 | End: 2021-04-19 | Stop reason: SDUPTHER

## 2021-04-15 NOTE — TELEPHONE ENCOUNTER
PHARMACY CALLED AND STATED SOMEONE WAS THERE TO  MEDICATION AND THEY DON'T HAVE RECORD OF THE MEDICATION.    PLEASE CALL AND ADVISE   [facility] - [place], [place] - [address] - [phone]  - [phone]   [phone]

## 2021-04-15 NOTE — TELEPHONE ENCOUNTER
Pharmacy is trying to get a refill on triamcinolone cream.  Left voicemail per chart note on 4/12/21, pt needs appt for medication

## 2021-04-15 NOTE — TELEPHONE ENCOUNTER
PATIENTS  CALLED HAS MADE PATIENT APPOINTMENT FOR April 22.    PATIENTS  STATED THAT PATIENTS FINGERS HAVE BEEN BLEEDING AND HAS BEEN OUT OF THE CREAM FOR ABOUT A WEEK.    PATIENTS  REQUESTING TO SEE IF A SMALLER TUBE CAN BE GIVEN UNTIL PATIENT IS SEEN.

## 2021-04-19 ENCOUNTER — OFFICE VISIT (OUTPATIENT)
Dept: FAMILY MEDICINE CLINIC | Facility: CLINIC | Age: 81
End: 2021-04-19

## 2021-04-19 VITALS
TEMPERATURE: 98.6 F | HEIGHT: 62 IN | BODY MASS INDEX: 22.12 KG/M2 | SYSTOLIC BLOOD PRESSURE: 118 MMHG | HEART RATE: 64 BPM | OXYGEN SATURATION: 100 % | DIASTOLIC BLOOD PRESSURE: 68 MMHG | WEIGHT: 120.2 LBS

## 2021-04-19 DIAGNOSIS — I10 ESSENTIAL HYPERTENSION: ICD-10-CM

## 2021-04-19 DIAGNOSIS — Z79.899 ENCOUNTER FOR LONG-TERM (CURRENT) USE OF MEDICATIONS: ICD-10-CM

## 2021-04-19 DIAGNOSIS — M81.0 AGE-RELATED OSTEOPOROSIS WITHOUT CURRENT PATHOLOGICAL FRACTURE: ICD-10-CM

## 2021-04-19 DIAGNOSIS — Z00.00 MEDICARE ANNUAL WELLNESS VISIT, SUBSEQUENT: Primary | ICD-10-CM

## 2021-04-19 DIAGNOSIS — Z78.0 MENOPAUSE: ICD-10-CM

## 2021-04-19 DIAGNOSIS — G31.84 MILD COGNITIVE IMPAIRMENT: ICD-10-CM

## 2021-04-19 DIAGNOSIS — M85.80 OSTEOPENIA, UNSPECIFIED LOCATION: ICD-10-CM

## 2021-04-19 PROCEDURE — G0439 PPPS, SUBSEQ VISIT: HCPCS | Performed by: FAMILY MEDICINE

## 2021-04-19 PROCEDURE — 1159F MED LIST DOCD IN RCRD: CPT | Performed by: FAMILY MEDICINE

## 2021-04-19 PROCEDURE — 99213 OFFICE O/P EST LOW 20 MIN: CPT | Performed by: FAMILY MEDICINE

## 2021-04-19 PROCEDURE — 1170F FXNL STATUS ASSESSED: CPT | Performed by: FAMILY MEDICINE

## 2021-04-19 PROCEDURE — 96160 PT-FOCUSED HLTH RISK ASSMT: CPT | Performed by: FAMILY MEDICINE

## 2021-04-19 RX ORDER — ATENOLOL 25 MG/1
25 TABLET ORAL DAILY
Qty: 90 TABLET | Refills: 3 | Status: SHIPPED | OUTPATIENT
Start: 2021-04-19 | End: 2021-04-23 | Stop reason: SDUPTHER

## 2021-04-19 RX ORDER — TRIAMCINOLONE ACETONIDE 1 MG/G
CREAM TOPICAL
Qty: 45 G | Refills: 1 | Status: SHIPPED | OUTPATIENT
Start: 2021-04-19 | End: 2021-06-03 | Stop reason: SDUPTHER

## 2021-04-19 RX ORDER — DONEPEZIL HYDROCHLORIDE 10 MG/1
10 TABLET, FILM COATED ORAL NIGHTLY
Qty: 90 TABLET | Refills: 3 | Status: SHIPPED | OUTPATIENT
Start: 2021-04-19 | End: 2022-01-12

## 2021-04-19 RX ORDER — ALENDRONATE SODIUM 70 MG/1
70 TABLET ORAL
Qty: 12 TABLET | Refills: 3 | Status: SHIPPED | OUTPATIENT
Start: 2021-04-19 | End: 2022-01-12

## 2021-04-19 NOTE — PROGRESS NOTES
The ABCs of the Annual Wellness Visit  Subsequent Medicare Wellness Visit    Chief Complaint   Patient presents with   • Medicare Wellness-subsequent     She is also here to follow-up on mild cognitive impairment.  She is taking donepezil 10 mg daily.  She has not had any side effects of the medication. She has noticed improvement in memory since starting it.  Patient is here to follow-up on osteopenia.  18 months ago we started the patient back on Fosamax weekly.  She states that she has not had any progression of back pain.  She is feeling pretty well.    Hypertension.  Her home blood pressure readings have been good and she is compliant with medications.  She denies any side effects.  Subjective   History of Present Illness:  Jennifer Sprague is a 80 y.o. female who presents for a Subsequent Medicare Wellness Visit.    HEALTH RISK ASSESSMENT    Recent Hospitalizations:  No hospitalization(s) within the last year.    Current Medical Providers:  Patient Care Team:  Aimee Duque DO as PCP - General (Family Medicine)    Smoking Status:  Social History     Tobacco Use   Smoking Status Former Smoker   Smokeless Tobacco Never Used       Alcohol Consumption:  Social History     Substance and Sexual Activity   Alcohol Use Yes       Depression Screen:   PHQ-2/PHQ-9 Depression Screening 4/19/2021   Little interest or pleasure in doing things 0   Feeling down, depressed, or hopeless 0   Total Score 0       Fall Risk Screen:  STEADI Fall Risk Assessment was completed, and patient is at LOW risk for falls.Assessment completed on:4/19/2021    Health Habits and Functional and Cognitive Screening:  Functional & Cognitive Status 4/19/2021   Do you have difficulty preparing food and eating? No   Do you have difficulty bathing yourself, getting dressed or grooming yourself? No   Do you have difficulty using the toilet? No   Do you have difficulty moving around from place to place? No   Do you have trouble with steps or getting  out of a bed or a chair? No   Current Diet Well Balanced Diet   Dental Exam Up to date   Eye Exam Up to date   Exercise (times per week) 2 times per week   Current Exercise Activities Include Walking   Do you need help using the phone?  No   Are you deaf or do you have serious difficulty hearing?  No   Do you need help with transportation? No   Do you need help shopping? No   Do you need help preparing meals?  No   Do you need help with housework?  No   Do you need help with laundry? No   Do you need help taking your medications? No   Do you need help managing money? No   Do you ever drive or ride in a car without wearing a seat belt? No         Does the patient have evidence of cognitive impairment? Yes    Asprin use counseling:Does not need ASA (and currently is not on it)    Age-appropriate Screening Schedule:  Refer to the list below for future screening recommendations based on patient's age, sex and/or medical conditions. Orders for these recommended tests are listed in the plan section. The patient has been provided with a written plan.    Health Maintenance   Topic Date Due   • ZOSTER VACCINE (2 of 3) 02/26/2010   • DXA SCAN  03/20/2019   • COLONOSCOPY  04/23/2021   • INFLUENZA VACCINE  08/01/2021   • TDAP/TD VACCINES (2 - Td) 11/01/2022   • MAMMOGRAM  Discontinued   • LIPID PANEL  Discontinued          The following portions of the patient's history were reviewed and updated as appropriate: allergies, current medications, past family history, past medical history, past social history, past surgical history and problem list.    Outpatient Medications Prior to Visit   Medication Sig Dispense Refill   • Multiple Vitamins-Minerals (MULTIVITAMIN ADULT PO) Take  by mouth.     • alendronate (FOSAMAX) 70 MG tablet Take 1 tablet by mouth Every 7 (Seven) Days. 12 tablet 3   • atenolol (TENORMIN) 25 MG tablet Take 1 tablet by mouth Daily. 90 tablet 3   • donepezil (ARICEPT) 10 MG tablet Take 1 tablet by mouth Every  Night. 90 tablet 1   • triamcinolone (KENALOG) 0.1 % cream APPLY TO THE AFFECTED AREAS OF SKIN TWO TIMES A DAY 45 g 1     No facility-administered medications prior to visit.       Patient Active Problem List   Diagnosis   • Hypercholesterolemia   • Hypertension   • Osteopenia   • History of colon polyps   • Encounter for long-term (current) use of medications   • Eczema       Advanced Care Planning:  ACP discussion was held with the patient during this visit. Patient has an advance directive (not in EMR), copy requested.    Review of Systems   Constitutional: Negative for activity change, appetite change, fatigue and unexpected weight change.   HENT: Negative for congestion, ear pain, nosebleeds, sore throat and tinnitus.    Eyes: Negative for pain, redness and visual disturbance.   Respiratory: Negative for cough, shortness of breath and wheezing.    Cardiovascular: Negative for chest pain, palpitations and leg swelling.   Gastrointestinal: Negative for abdominal pain, blood in stool and nausea.   Endocrine: Negative for polydipsia and polyuria.   Genitourinary: Negative for dysuria, frequency, menstrual problem and vaginal discharge.   Musculoskeletal: Negative for arthralgias, joint swelling and myalgias.   Skin: Negative for rash.   Allergic/Immunologic: Negative for environmental allergies, food allergies and immunocompromised state.   Neurological: Negative for dizziness, speech difficulty, weakness and headaches.   Hematological: Negative for adenopathy. Does not bruise/bleed easily.   Psychiatric/Behavioral: Negative for decreased concentration and dysphoric mood. The patient is not nervous/anxious.        Compared to one year ago, the patient feels her physical health is the same.  Compared to one year ago, the patient feels her mental health is the same.    Reviewed chart for potential of high risk medication in the elderly: yes  Reviewed chart for potential of harmful drug interactions in the  "elderly:yes    Objective         Vitals:    04/19/21 1319   BP: 118/68   Pulse: 64   Temp: 98.6 °F (37 °C)   SpO2: 100%   Weight: 54.5 kg (120 lb 3.2 oz)   Height: 157.5 cm (62.01\")       Body mass index is 21.98 kg/m².  Discussed the patient's BMI with her. The BMI is in the acceptable range.    Physical Exam  Vitals and nursing note reviewed.   Constitutional:       Appearance: She is well-developed.   HENT:      Head: Normocephalic and atraumatic.   Eyes:      Conjunctiva/sclera: Conjunctivae normal.   Cardiovascular:      Rate and Rhythm: Normal rate and regular rhythm.      Heart sounds: Normal heart sounds.   Pulmonary:      Effort: Pulmonary effort is normal.      Breath sounds: Normal breath sounds.   Abdominal:      General: Bowel sounds are normal.      Palpations: Abdomen is soft.   Musculoskeletal:         General: Normal range of motion.      Cervical back: Normal range of motion and neck supple.   Skin:     General: Skin is warm and dry.      Capillary Refill: Capillary refill takes less than 2 seconds.      Findings: No rash.   Neurological:      General: No focal deficit present.      Mental Status: She is alert. Mental status is at baseline.   Psychiatric:         Mood and Affect: Mood normal.         Behavior: Behavior normal.         Thought Content: Thought content normal.         Judgment: Judgment normal.               Assessment/Plan   Medicare Risks and Personalized Health Plan  CMS Preventative Services Quick Reference  Advance Directive Discussion  Breast Cancer/Mammogram Screening  Dementia/Memory   Depression/Dysphoria    The above risks/problems have been discussed with the patient.  Pertinent information has been shared with the patient in the After Visit Summary.  Follow up plans and orders are seen below in the Assessment/Plan Section.    Diagnoses and all orders for this visit:    1. Medicare annual wellness visit, subsequent (Primary)    2. Age-related osteoporosis without current " pathological fracture  -     DEXA Bone Density Axial; Future    3. Menopause  -     DEXA Bone Density Axial; Future    4. Mild cognitive impairment  -     donepezil (ARICEPT) 10 MG tablet; Take 1 tablet by mouth Every Night.  Dispense: 90 tablet; Refill: 3    5. Essential hypertension  -     atenolol (TENORMIN) 25 MG tablet; Take 1 tablet by mouth Daily.  Dispense: 90 tablet; Refill: 3  -     Comprehensive Metabolic Panel    6. Osteopenia, unspecified location  -     alendronate (FOSAMAX) 70 MG tablet; Take 1 tablet by mouth Every 7 (Seven) Days.  Dispense: 12 tablet; Refill: 3    7. Encounter for long-term (current) use of medications  -     TSH  -     CBC & Differential  -     Comprehensive Metabolic Panel      Follow Up:  No follow-ups on file.     An After Visit Summary and PPPS were given to the patient.

## 2021-04-20 LAB
ALBUMIN SERPL-MCNC: 3.8 G/DL (ref 3.7–4.7)
ALBUMIN/GLOB SERPL: 1.5 {RATIO} (ref 1.2–2.2)
ALP SERPL-CCNC: 90 IU/L (ref 39–117)
ALT SERPL-CCNC: 12 IU/L (ref 0–32)
AST SERPL-CCNC: 22 IU/L (ref 0–40)
BASOPHILS # BLD AUTO: 0 X10E3/UL (ref 0–0.2)
BASOPHILS NFR BLD AUTO: 0 %
BILIRUB SERPL-MCNC: 0.3 MG/DL (ref 0–1.2)
BUN SERPL-MCNC: 11 MG/DL (ref 8–27)
BUN/CREAT SERPL: 13 (ref 12–28)
CALCIUM SERPL-MCNC: 9 MG/DL (ref 8.7–10.3)
CHLORIDE SERPL-SCNC: 106 MMOL/L (ref 96–106)
CO2 SERPL-SCNC: 19 MMOL/L (ref 20–29)
CREAT SERPL-MCNC: 0.84 MG/DL (ref 0.57–1)
EOSINOPHIL # BLD AUTO: 0 X10E3/UL (ref 0–0.4)
EOSINOPHIL NFR BLD AUTO: 1 %
ERYTHROCYTE [DISTWIDTH] IN BLOOD BY AUTOMATED COUNT: 12.6 % (ref 11.7–15.4)
GLOBULIN SER CALC-MCNC: 2.5 G/DL (ref 1.5–4.5)
GLUCOSE SERPL-MCNC: 82 MG/DL (ref 65–99)
HCT VFR BLD AUTO: 38.1 % (ref 34–46.6)
HGB BLD-MCNC: 12.9 G/DL (ref 11.1–15.9)
IMM GRANULOCYTES # BLD AUTO: 0 X10E3/UL (ref 0–0.1)
IMM GRANULOCYTES NFR BLD AUTO: 0 %
LYMPHOCYTES # BLD AUTO: 2.1 X10E3/UL (ref 0.7–3.1)
LYMPHOCYTES NFR BLD AUTO: 32 %
MCH RBC QN AUTO: 31.7 PG (ref 26.6–33)
MCHC RBC AUTO-ENTMCNC: 33.9 G/DL (ref 31.5–35.7)
MCV RBC AUTO: 94 FL (ref 79–97)
MONOCYTES # BLD AUTO: 0.5 X10E3/UL (ref 0.1–0.9)
MONOCYTES NFR BLD AUTO: 8 %
NEUTROPHILS # BLD AUTO: 3.9 X10E3/UL (ref 1.4–7)
NEUTROPHILS NFR BLD AUTO: 59 %
PLATELET # BLD AUTO: 204 X10E3/UL (ref 150–450)
POTASSIUM SERPL-SCNC: 4.6 MMOL/L (ref 3.5–5.2)
PROT SERPL-MCNC: 6.3 G/DL (ref 6–8.5)
RBC # BLD AUTO: 4.07 X10E6/UL (ref 3.77–5.28)
SODIUM SERPL-SCNC: 140 MMOL/L (ref 134–144)
TSH SERPL DL<=0.005 MIU/L-ACNC: 1.83 UIU/ML (ref 0.45–4.5)
WBC # BLD AUTO: 6.5 X10E3/UL (ref 3.4–10.8)

## 2021-04-23 DIAGNOSIS — I10 ESSENTIAL HYPERTENSION: ICD-10-CM

## 2021-04-23 RX ORDER — ATENOLOL 25 MG/1
25 TABLET ORAL DAILY
Qty: 90 TABLET | Refills: 3 | Status: SHIPPED | OUTPATIENT
Start: 2021-04-23 | End: 2022-05-05

## 2021-06-03 DIAGNOSIS — L30.9 ECZEMA, UNSPECIFIED TYPE: ICD-10-CM

## 2021-06-03 RX ORDER — TRIAMCINOLONE ACETONIDE 1 MG/G
CREAM TOPICAL 2 TIMES DAILY
Qty: 45 G | Refills: 0 | Status: SHIPPED | OUTPATIENT
Start: 2021-06-03 | End: 2021-06-07 | Stop reason: SDUPTHER

## 2021-06-07 DIAGNOSIS — L30.9 ECZEMA, UNSPECIFIED TYPE: ICD-10-CM

## 2021-06-07 RX ORDER — TRIAMCINOLONE ACETONIDE 1 MG/G
CREAM TOPICAL 2 TIMES DAILY
Qty: 45 G | Refills: 0 | Status: SHIPPED | OUTPATIENT
Start: 2021-06-07 | End: 2021-11-23

## 2021-06-08 ENCOUNTER — TELEPHONE (OUTPATIENT)
Dept: FAMILY MEDICINE CLINIC | Facility: CLINIC | Age: 81
End: 2021-06-08

## 2021-06-08 NOTE — TELEPHONE ENCOUNTER
Called pt to see if she was going to get her dexa done. Albina as well maxwell Zuleta has tried reaching the pt to [facility]. Katie holly to call back

## 2021-06-23 ENCOUNTER — TELEPHONE (OUTPATIENT)
Dept: FAMILY MEDICINE CLINIC | Facility: CLINIC | Age: 81
End: 2021-06-23

## 2021-07-12 ENCOUNTER — TELEPHONE (OUTPATIENT)
Dept: FAMILY MEDICINE CLINIC | Facility: CLINIC | Age: 81
End: 2021-07-12

## 2021-07-12 NOTE — TELEPHONE ENCOUNTER
Called pt to see if she had her dexa scan done.  Left her the number to [facility] to reschedule on her voicemail.  Will postpone for 1 month

## 2021-11-22 DIAGNOSIS — L30.9 ECZEMA, UNSPECIFIED TYPE: ICD-10-CM

## 2021-11-23 RX ORDER — TRIAMCINOLONE ACETONIDE 1 MG/G
CREAM TOPICAL
Qty: 45 G | Refills: 0 | Status: SHIPPED | OUTPATIENT
Start: 2021-11-23 | End: 2022-01-12

## 2022-01-11 DIAGNOSIS — M85.80 OSTEOPENIA, UNSPECIFIED LOCATION: ICD-10-CM

## 2022-01-11 DIAGNOSIS — G31.84 MILD COGNITIVE IMPAIRMENT: ICD-10-CM

## 2022-01-11 DIAGNOSIS — L30.9 ECZEMA, UNSPECIFIED TYPE: ICD-10-CM

## 2022-01-12 RX ORDER — TRIAMCINOLONE ACETONIDE 1 MG/G
CREAM TOPICAL
Qty: 45 G | Refills: 0 | Status: SHIPPED | OUTPATIENT
Start: 2022-01-12 | End: 2022-04-03

## 2022-01-12 RX ORDER — ALENDRONATE SODIUM 70 MG/1
TABLET ORAL
Qty: 12 TABLET | Refills: 0 | Status: SHIPPED | OUTPATIENT
Start: 2022-01-12 | End: 2022-04-03

## 2022-01-12 RX ORDER — DONEPEZIL HYDROCHLORIDE 10 MG/1
TABLET, FILM COATED ORAL
Qty: 90 TABLET | Refills: 0 | Status: SHIPPED | OUTPATIENT
Start: 2022-01-12 | End: 2022-06-06 | Stop reason: SDUPTHER

## 2022-04-01 DIAGNOSIS — L30.9 ECZEMA, UNSPECIFIED TYPE: ICD-10-CM

## 2022-04-01 DIAGNOSIS — M85.80 OSTEOPENIA, UNSPECIFIED LOCATION: ICD-10-CM

## 2022-04-03 RX ORDER — TRIAMCINOLONE ACETONIDE 1 MG/G
CREAM TOPICAL
Qty: 45 G | Refills: 0 | Status: SHIPPED | OUTPATIENT
Start: 2022-04-03 | End: 2022-06-13

## 2022-04-03 RX ORDER — ALENDRONATE SODIUM 70 MG/1
TABLET ORAL
Qty: 12 TABLET | Refills: 0 | Status: SHIPPED | OUTPATIENT
Start: 2022-04-03 | End: 2022-06-06 | Stop reason: SDUPTHER

## 2022-05-05 DIAGNOSIS — I10 ESSENTIAL HYPERTENSION: ICD-10-CM

## 2022-05-05 RX ORDER — ATENOLOL 25 MG/1
TABLET ORAL
Qty: 90 TABLET | Refills: 0 | Status: SHIPPED | OUTPATIENT
Start: 2022-05-05 | End: 2022-06-06 | Stop reason: SDUPTHER

## 2022-06-06 ENCOUNTER — TELEPHONE (OUTPATIENT)
Dept: FAMILY MEDICINE CLINIC | Facility: CLINIC | Age: 82
End: 2022-06-06

## 2022-06-06 ENCOUNTER — OFFICE VISIT (OUTPATIENT)
Dept: FAMILY MEDICINE CLINIC | Facility: CLINIC | Age: 82
End: 2022-06-06

## 2022-06-06 VITALS
WEIGHT: 112.6 LBS | DIASTOLIC BLOOD PRESSURE: 86 MMHG | HEIGHT: 63 IN | SYSTOLIC BLOOD PRESSURE: 124 MMHG | HEART RATE: 78 BPM | TEMPERATURE: 98.4 F | BODY MASS INDEX: 19.95 KG/M2 | OXYGEN SATURATION: 99 %

## 2022-06-06 DIAGNOSIS — I10 ESSENTIAL HYPERTENSION: ICD-10-CM

## 2022-06-06 DIAGNOSIS — M81.0 AGE-RELATED OSTEOPOROSIS WITHOUT CURRENT PATHOLOGICAL FRACTURE: ICD-10-CM

## 2022-06-06 DIAGNOSIS — G31.84 MILD COGNITIVE IMPAIRMENT: ICD-10-CM

## 2022-06-06 DIAGNOSIS — M85.80 OSTEOPENIA, UNSPECIFIED LOCATION: ICD-10-CM

## 2022-06-06 DIAGNOSIS — Z00.00 MEDICARE ANNUAL WELLNESS VISIT, SUBSEQUENT: Primary | ICD-10-CM

## 2022-06-06 DIAGNOSIS — Z78.0 MENOPAUSE: ICD-10-CM

## 2022-06-06 DIAGNOSIS — Z79.899 ENCOUNTER FOR LONG-TERM (CURRENT) USE OF MEDICATIONS: ICD-10-CM

## 2022-06-06 PROCEDURE — 96160 PT-FOCUSED HLTH RISK ASSMT: CPT | Performed by: FAMILY MEDICINE

## 2022-06-06 PROCEDURE — 1159F MED LIST DOCD IN RCRD: CPT | Performed by: FAMILY MEDICINE

## 2022-06-06 PROCEDURE — 1170F FXNL STATUS ASSESSED: CPT | Performed by: FAMILY MEDICINE

## 2022-06-06 PROCEDURE — G0439 PPPS, SUBSEQ VISIT: HCPCS | Performed by: FAMILY MEDICINE

## 2022-06-06 RX ORDER — ALENDRONATE SODIUM 70 MG/1
70 TABLET ORAL
Qty: 12 TABLET | Refills: 3 | Status: SHIPPED | OUTPATIENT
Start: 2022-06-06

## 2022-06-06 RX ORDER — ATENOLOL 25 MG/1
25 TABLET ORAL DAILY
Qty: 90 TABLET | Refills: 1 | Status: SHIPPED | OUTPATIENT
Start: 2022-06-06 | End: 2022-06-06 | Stop reason: SDUPTHER

## 2022-06-06 RX ORDER — ATENOLOL 25 MG/1
25 TABLET ORAL DAILY
Qty: 90 TABLET | Refills: 3 | Status: SHIPPED | OUTPATIENT
Start: 2022-06-06 | End: 2022-06-06 | Stop reason: SDUPTHER

## 2022-06-06 RX ORDER — DONEPEZIL HYDROCHLORIDE 10 MG/1
10 TABLET, FILM COATED ORAL NIGHTLY
Qty: 90 TABLET | Refills: 3 | Status: SHIPPED | OUTPATIENT
Start: 2022-06-06 | End: 2022-07-11

## 2022-06-06 RX ORDER — ALENDRONATE SODIUM 70 MG/1
70 TABLET ORAL
Qty: 12 TABLET | Refills: 3 | Status: SHIPPED | OUTPATIENT
Start: 2022-06-06 | End: 2022-06-06 | Stop reason: SDUPTHER

## 2022-06-06 RX ORDER — ATENOLOL 25 MG/1
25 TABLET ORAL DAILY
Qty: 90 TABLET | Refills: 3 | Status: SHIPPED | OUTPATIENT
Start: 2022-06-06

## 2022-06-06 NOTE — TELEPHONE ENCOUNTER
Caller: Jennifer Sprague    Relationship to patient: Self    Best call back number: [phone]    Patient is needing: PATIENT ADVISED SHE USES [facility] - [place], [place] - [address] - [phone] [facility] [phone] FX.  PLEASE SEND HER PRESCRIPTIONS FROM TODAY THERE.

## 2022-06-06 NOTE — PROGRESS NOTES
The ABCs of the Annual Wellness Visit  Subsequent Medicare Wellness Visit    Chief Complaint   Patient presents with   • Medicare Wellness-subsequent      She is also here to follow-up on mild cognitive impairment.  She is taking donepezil 10 mg daily.  She has not had any side effects of the medication. She has noticed improvement in memory since starting it.    Patient is here to follow-up on osteopenia.  11/2019 we started the patient back on Fosamax weekly.  She states that she has not had any progression of back pain.  She is feeling pretty well.      Hypertension.  Her home blood pressure readings have been good and she is compliant with medications.  She denies any side effects.    Subjective    History of Present Illness:  Jennifer Sprague is a 81 y.o. female who presents for a Subsequent Medicare Wellness Visit.    The following portions of the patient's history were reviewed and   updated as appropriate: allergies, current medications, past family history, past medical history, past social history, past surgical history and problem list.    Compared to one year ago, the patient feels her physical   health is the same.    Compared to one year ago, the patient feels her mental   health is the same.    Recent Hospitalizations:  She was not admitted to the hospital during the last year.       Current Medical Providers:  Patient Care Team:  Aimee Duque DO as PCP - General (Family Medicine)    Outpatient Medications Prior to Visit   Medication Sig Dispense Refill   • CBD (cannabidiol) oral oil Take  by mouth.     • Multiple Vitamins-Minerals (MULTIVITAMIN ADULT PO) Take  by mouth.     • triamcinolone (KENALOG) 0.1 % cream APPLY TO THE APPROPRIATE AREA(S) TOPICALLY AS DIRECTED TWICE DAILY 45 g 0   • alendronate (FOSAMAX) 70 MG tablet TAKE 1 TABLET EVERY 7 DAYS 12 tablet 0   • atenolol (TENORMIN) 25 MG tablet TAKE 1 TABLET EVERY DAY 90 tablet 0   • donepezil (ARICEPT) 10 MG tablet TAKE 1 TABLET EVERY NIGHT  "90 tablet 0     No facility-administered medications prior to visit.       No opioid medication identified on active medication list. I have reviewed chart for other potential  high risk medication/s and harmful drug interactions in the elderly.          Aspirin is not on active medication list.  Aspirin use is not indicated based on review of current medical condition/s. Risk of harm outweighs potential benefits.  .    Patient Active Problem List   Diagnosis   • Hypercholesterolemia   • Hypertension   • Osteopenia   • History of colon polyps   • Encounter for long-term (current) use of medications   • Eczema     Advance Care Planning  Advance Directive is not on file.  ACP discussion was held with the patient during this visit. Patient does not have an advance directive, information provided.          Objective    Vitals:    06/06/22 1407   BP: 124/86   Pulse: 78   Temp: 98.4 °F (36.9 °C)   SpO2: 99%   Weight: 51.1 kg (112 lb 9.6 oz)   Height: 160 cm (63\")     Body mass index is 19.95 kg/m².  BMI is within normal parameters. No other follow-up for BMI required.    Does the patient have evidence of cognitive impairment? Yes    Physical Exam  Vitals and nursing note reviewed.   Constitutional:       Appearance: Normal appearance. She is well-developed and normal weight.   HENT:      Head: Normocephalic and atraumatic.   Eyes:      General: No scleral icterus.     Conjunctiva/sclera: Conjunctivae normal.   Cardiovascular:      Rate and Rhythm: Normal rate and regular rhythm.      Heart sounds: Normal heart sounds.   Pulmonary:      Effort: Pulmonary effort is normal.      Breath sounds: Normal breath sounds.   Musculoskeletal:         General: Normal range of motion.      Cervical back: Normal range of motion and neck supple.   Skin:     General: Skin is warm and dry.      Capillary Refill: Capillary refill takes less than 2 seconds.      Findings: No rash.   Neurological:      Mental Status: She is alert and oriented " to person, place, and time.   Psychiatric:         Mood and Affect: Mood normal.         Behavior: Behavior normal.         Thought Content: Thought content normal.         Judgment: Judgment normal.                 HEALTH RISK ASSESSMENT    Smoking Status:  Social History     Tobacco Use   Smoking Status Former Smoker   Smokeless Tobacco Never Used     Alcohol Consumption:  Social History     Substance and Sexual Activity   Alcohol Use Yes     Fall Risk Screen:    DERECK Fall Risk Assessment was completed, and patient is at LOW risk for falls.Assessment completed on:6/6/2022    Depression Screening:  PHQ-2/PHQ-9 Depression Screening 6/6/2022   Retired PHQ-9 Total Score -   Retired Total Score -   Little Interest or Pleasure in Doing Things 0-->not at all   Feeling Down, Depressed or Hopeless 0-->not at all   PHQ-9: Brief Depression Severity Measure Score 0       Health Habits and Functional and Cognitive Screening:  Functional & Cognitive Status 6/6/2022   Do you have difficulty preparing food and eating? No   Do you have difficulty bathing yourself, getting dressed or grooming yourself? No   Do you have difficulty using the toilet? No   Do you have difficulty moving around from place to place? No   Do you have trouble with steps or getting out of a bed or a chair? No   Current Diet Well Balanced Diet   Dental Exam Up to date   Eye Exam Up to date   Exercise (times per week) 5 times per week   Current Exercises Include Walking   Current Exercise Activities Include -   Do you need help using the phone?  No   Are you deaf or do you have serious difficulty hearing?  Yes   Do you need help with transportation? No   Do you need help shopping? No   Do you need help preparing meals?  No   Do you need help with housework?  No   Do you need help with laundry? No   Do you need help taking your medications? No   Do you need help managing money? No   Do you ever drive or ride in a car without wearing a seat belt? No        Age-appropriate Screening Schedule:  Refer to the list below for future screening recommendations based on patient's age, sex and/or medical conditions. Orders for these recommended tests are listed in the plan section. The patient has been provided with a written plan.    Health Maintenance   Topic Date Due   • DXA SCAN  03/20/2019   • ZOSTER VACCINE (3 of 3) 02/11/2022   • INFLUENZA VACCINE  08/01/2022   • TDAP/TD VACCINES (2 - Td or Tdap) 11/01/2022   • MAMMOGRAM  Discontinued   • LIPID PANEL  Discontinued              Assessment & Plan   CMS Preventative Services Quick Reference  Risk Factors Identified During Encounter  Dementia/Memory   Immunizations Discussed/Encouraged (specific Immunizations; Prevnar 20 (Pneumococcal 20-valent conjugate), Shingrix and COVID19  The above risks/problems have been discussed with the patient.  Follow up actions/plans if indicated are seen below in the Assessment/Plan Section.  Pertinent information has been shared with the patient in the After Visit Summary.    Diagnoses and all orders for this visit:    1. Medicare annual wellness visit, subsequent (Primary)    2. Mild cognitive impairment  -     donepezil (ARICEPT) 10 MG tablet; Take 1 tablet by mouth Every Night.  Dispense: 90 tablet; Refill: 3    3. Essential hypertension  -     Comprehensive Metabolic Panel  -     Discontinue: atenolol (TENORMIN) 25 MG tablet; Take 1 tablet by mouth Daily.  Dispense: 90 tablet; Refill: 1  -     atenolol (TENORMIN) 25 MG tablet; Take 1 tablet by mouth Daily.  Dispense: 90 tablet; Refill: 3    4. Age-related osteoporosis without current pathological fracture  -     DEXA Bone Density Axial; Future    5. Encounter for long-term (current) use of medications  -     CBC & Differential  -     Comprehensive Metabolic Panel  -     TSH    6. Menopause  -     DEXA Bone Density Axial; Future    7. Osteopenia, unspecified location  -     alendronate (FOSAMAX) 70 MG tablet; Take 1 tablet by mouth  Every 7 (Seven) Days.  Dispense: 12 tablet; Refill: 3    Patient is here today for follow-up of chronic stable hypertension, osteoporosis, and mild cognitive impairment.  Surveillance labs were obtained today and any medication changes will be made based on lab results and will be called to the patient later this week.    RHM.  Patient declines colon cancer screening and mammograms.  She will continue with DEXA scans and order was entered    follow Up:   Return in about 1 year (around 6/6/2023) for Medicare Wellness, HTN.     An After Visit Summary and PPPS were made available to the patient.

## 2022-06-07 LAB
ALBUMIN SERPL-MCNC: 3.9 G/DL (ref 3.6–4.6)
ALBUMIN/GLOB SERPL: 1.7 {RATIO} (ref 1.2–2.2)
ALP SERPL-CCNC: 91 IU/L (ref 44–121)
ALT SERPL-CCNC: 7 IU/L (ref 0–32)
AST SERPL-CCNC: 15 IU/L (ref 0–40)
BASOPHILS # BLD AUTO: 0 X10E3/UL (ref 0–0.2)
BASOPHILS NFR BLD AUTO: 0 %
BILIRUB SERPL-MCNC: 0.3 MG/DL (ref 0–1.2)
BUN SERPL-MCNC: 12 MG/DL (ref 8–27)
BUN/CREAT SERPL: 12 (ref 12–28)
CALCIUM SERPL-MCNC: 9 MG/DL (ref 8.7–10.3)
CHLORIDE SERPL-SCNC: 105 MMOL/L (ref 96–106)
CO2 SERPL-SCNC: 22 MMOL/L (ref 20–29)
CREAT SERPL-MCNC: 1.01 MG/DL (ref 0.57–1)
EGFRCR SERPLBLD CKD-EPI 2021: 56 ML/MIN/1.73
EOSINOPHIL # BLD AUTO: 0 X10E3/UL (ref 0–0.4)
EOSINOPHIL NFR BLD AUTO: 1 %
ERYTHROCYTE [DISTWIDTH] IN BLOOD BY AUTOMATED COUNT: 13.1 % (ref 11.7–15.4)
GLOBULIN SER CALC-MCNC: 2.3 G/DL (ref 1.5–4.5)
GLUCOSE SERPL-MCNC: 87 MG/DL (ref 65–99)
HCT VFR BLD AUTO: 39.5 % (ref 34–46.6)
HGB BLD-MCNC: 12.9 G/DL (ref 11.1–15.9)
IMM GRANULOCYTES # BLD AUTO: 0 X10E3/UL (ref 0–0.1)
IMM GRANULOCYTES NFR BLD AUTO: 0 %
LYMPHOCYTES # BLD AUTO: 2.2 X10E3/UL (ref 0.7–3.1)
LYMPHOCYTES NFR BLD AUTO: 36 %
MCH RBC QN AUTO: 31.5 PG (ref 26.6–33)
MCHC RBC AUTO-ENTMCNC: 32.7 G/DL (ref 31.5–35.7)
MCV RBC AUTO: 96 FL (ref 79–97)
MONOCYTES # BLD AUTO: 0.4 X10E3/UL (ref 0.1–0.9)
MONOCYTES NFR BLD AUTO: 6 %
NEUTROPHILS # BLD AUTO: 3.5 X10E3/UL (ref 1.4–7)
NEUTROPHILS NFR BLD AUTO: 57 %
PLATELET # BLD AUTO: 206 X10E3/UL (ref 150–450)
POTASSIUM SERPL-SCNC: 4.3 MMOL/L (ref 3.5–5.2)
PROT SERPL-MCNC: 6.2 G/DL (ref 6–8.5)
RBC # BLD AUTO: 4.1 X10E6/UL (ref 3.77–5.28)
SODIUM SERPL-SCNC: 142 MMOL/L (ref 134–144)
TSH SERPL DL<=0.005 MIU/L-ACNC: 1.77 UIU/ML (ref 0.45–4.5)
WBC # BLD AUTO: 6.2 X10E3/UL (ref 3.4–10.8)

## 2022-06-11 DIAGNOSIS — L30.9 ECZEMA, UNSPECIFIED TYPE: ICD-10-CM

## 2022-06-13 RX ORDER — TRIAMCINOLONE ACETONIDE 1 MG/G
CREAM TOPICAL
Qty: 45 G | Refills: 0 | Status: SHIPPED | OUTPATIENT
Start: 2022-06-13 | End: 2022-11-03

## 2022-07-09 DIAGNOSIS — G31.84 MILD COGNITIVE IMPAIRMENT: ICD-10-CM

## 2022-07-11 RX ORDER — DONEPEZIL HYDROCHLORIDE 10 MG/1
TABLET, FILM COATED ORAL
Qty: 90 TABLET | Refills: 3 | Status: SHIPPED | OUTPATIENT
Start: 2022-07-11

## 2022-10-03 ENCOUNTER — APPOINTMENT (OUTPATIENT)
Dept: BONE DENSITY | Facility: HOSPITAL | Age: 82
End: 2022-10-03

## 2022-10-17 ENCOUNTER — TELEPHONE (OUTPATIENT)
Dept: FAMILY MEDICINE CLINIC | Facility: CLINIC | Age: 82
End: 2022-10-17

## 2022-10-17 NOTE — TELEPHONE ENCOUNTER
Left VM about overdue dexa scan.  Left number to call and reschedule.  I will postpone for 2 weeks

## 2022-11-03 DIAGNOSIS — L30.9 ECZEMA, UNSPECIFIED TYPE: ICD-10-CM

## 2022-11-03 RX ORDER — TRIAMCINOLONE ACETONIDE 1 MG/G
CREAM TOPICAL
Qty: 45 G | Refills: 0 | Status: SHIPPED | OUTPATIENT
Start: 2022-11-03

## 2022-12-01 ENCOUNTER — OFFICE VISIT (OUTPATIENT)
Dept: FAMILY MEDICINE CLINIC | Facility: CLINIC | Age: 82
End: 2022-12-01

## 2022-12-01 VITALS
WEIGHT: 115.2 LBS | TEMPERATURE: 97.1 F | SYSTOLIC BLOOD PRESSURE: 148 MMHG | DIASTOLIC BLOOD PRESSURE: 80 MMHG | OXYGEN SATURATION: 99 % | HEART RATE: 71 BPM | HEIGHT: 63 IN | BODY MASS INDEX: 20.41 KG/M2

## 2022-12-01 DIAGNOSIS — H61.23 BILATERAL IMPACTED CERUMEN: ICD-10-CM

## 2022-12-01 DIAGNOSIS — L73.9 FOLLICULITIS: Primary | ICD-10-CM

## 2022-12-01 PROCEDURE — 99213 OFFICE O/P EST LOW 20 MIN: CPT | Performed by: FAMILY MEDICINE

## 2022-12-01 PROCEDURE — 69210 REMOVE IMPACTED EAR WAX UNI: CPT | Performed by: FAMILY MEDICINE

## 2022-12-01 RX ORDER — CEPHALEXIN 500 MG/1
500 CAPSULE ORAL 3 TIMES DAILY
Qty: 30 CAPSULE | Refills: 0 | Status: SHIPPED | OUTPATIENT
Start: 2022-12-01

## 2022-12-01 NOTE — PROGRESS NOTES
"Chief Complaint  Hearing Problem (EARS NEED TO BE CLEANED OUT )    Subjective        Jennifer Sprague presents to [facility]  History of Present Illness  Patient is here today with her daughter because her hearing has been poor lately.  She has had issues in the past with cerumen impactions.  After cleaning her ears out bilaterally she did get improvement of her hearing.    Patient is also here for a new rash that has been tender on her right buttock for the past 3 months.  She has not used any new lotions or creams and she does not have the rash anywhere else on her body.  No fevers or chills.  Patient did have the Zostavax years ago.  Earache   There is pain in both ears. This is a new problem. The current episode started in the past 7 days. The problem occurs constantly. The problem has been gradually worsening. There has been no fever. The pain is at a severity of 7/10. Associated symptoms include hearing loss.       Objective   Vital Signs:  /80   Pulse 71   Temp 97.1 °F (36.2 °C)   Ht 160 cm (63\")   Wt 52.3 kg (115 lb 3.2 oz)   SpO2 99%   BMI 20.41 kg/m²   Estimated body mass index is 20.41 kg/m² as calculated from the following:    Height as of this encounter: 160 cm (63\").    Weight as of this encounter: 52.3 kg (115 lb 3.2 oz).    BMI is within normal parameters. No other follow-up for BMI required.      Physical Exam  Vitals and nursing note reviewed.   Constitutional:       Appearance: Normal appearance. She is well-developed and normal weight.   HENT:      Head: Normocephalic and atraumatic.      Right Ear: Tympanic membrane, ear canal and external ear normal.      Left Ear: Tympanic membrane, ear canal and external ear normal.      Ears:      Comments: Bilateral cerumen impactions were removed by irrigation and instrumentation     Nose: Nose normal.   Eyes:      General: No scleral icterus.     Conjunctiva/sclera: Conjunctivae normal.   Pulmonary:      Effort: " Pulmonary effort is normal.   Musculoskeletal:      Cervical back: Normal range of motion and neck supple.   Lymphadenopathy:      Cervical: No cervical adenopathy.   Skin:     General: Skin is warm and dry.      Findings: Rash (Erythematous papules in a clustered patch about 2 x 2 inches on the lower portion of the right buttock) present.   Neurological:      Mental Status: She is alert and oriented to person, place, and time.   Psychiatric:         Mood and Affect: Mood normal.         Behavior: Behavior normal.         Thought Content: Thought content normal.         Judgment: Judgment normal.        Result Review :  The following data was reviewed by: Aimee Duque DO on 12/01/2022:  Common labs    Common Labs 6/6/22 6/6/22    1438 1438   Glucose  87   BUN  12   Creatinine  1.01 (A)   Sodium  142   Potassium  4.3   Chloride  105   Calcium  9.0   Total Protein  6.2   Albumin  3.9   Total Bilirubin  0.3   Alkaline Phosphatase  91   AST (SGOT)  15   ALT (SGPT)  7   WBC 6.2    Hemoglobin 12.9    Hematocrit 39.5    Platelets 206    (A) Abnormal value               Ear Cerumen Removal    Date/Time: 12/1/2022 1:40 PM  Performed by: Aimee Duque DO  Authorized by: Aimee Duque DO     Anesthesia:  Local Anesthetic: none  Ceruminolytics applied: Ceruminolytics applied prior to the procedure.  Location details: left ear and right ear  Patient tolerance: patient tolerated the procedure well with no immediate complications  Procedure type: instrumentation, irrigation   Sedation:  Patient sedated: no              Assessment and Plan   Diagnoses and all orders for this visit:    1. Folliculitis (Primary)  -     cephalexin (Keflex) 500 MG capsule; Take 1 capsule by mouth 3 (Three) Times a Day.  Dispense: 30 capsule; Refill: 0    2. Bilateral impacted cerumen    Other orders  -     Ear Cerumen Removal    Rash or folliculitis.  The rash has the appearance of shingles or herpes simplex but could also be folliculitis.   I have advised the patient to wash with soap and water 2-3 times daily and take the above antibiotic until completion.  If the rash is unchanged I will send the patient to dermatology.  If the rash worsens follow-up.           Follow Up   No follow-ups on file.  Patient was given instructions and counseling regarding her condition or for health maintenance advice. Please see specific information pulled into the AVS if appropriate.       Answers for HPI/ROS submitted by the patient on 11/28/2022  What is the primary reason for your visit?: Ear Pain

## 2023-03-08 ENCOUNTER — TELEPHONE (OUTPATIENT)
Dept: FAMILY MEDICINE CLINIC | Facility: CLINIC | Age: 83
End: 2023-03-08
Payer: MEDICARE

## 2023-03-08 NOTE — TELEPHONE ENCOUNTER
Left VM for pt about overdue dexa scan.  Left not call and reschedule.  I will postpone for 1 month

## 2023-06-08 ENCOUNTER — OFFICE VISIT (OUTPATIENT)
Dept: FAMILY MEDICINE CLINIC | Facility: CLINIC | Age: 83
End: 2023-06-08
Payer: MEDICARE

## 2023-06-08 VITALS
TEMPERATURE: 97.7 F | HEART RATE: 68 BPM | SYSTOLIC BLOOD PRESSURE: 134 MMHG | WEIGHT: 109.6 LBS | HEIGHT: 63 IN | DIASTOLIC BLOOD PRESSURE: 71 MMHG | BODY MASS INDEX: 19.42 KG/M2 | OXYGEN SATURATION: 97 %

## 2023-06-08 DIAGNOSIS — Z00.00 MEDICARE ANNUAL WELLNESS VISIT, SUBSEQUENT: Primary | ICD-10-CM

## 2023-06-08 DIAGNOSIS — Z79.899 ENCOUNTER FOR LONG-TERM (CURRENT) USE OF MEDICATIONS: ICD-10-CM

## 2023-06-08 DIAGNOSIS — Z78.0 MENOPAUSE: ICD-10-CM

## 2023-06-08 DIAGNOSIS — M81.0 AGE-RELATED OSTEOPOROSIS WITHOUT CURRENT PATHOLOGICAL FRACTURE: ICD-10-CM

## 2023-06-08 DIAGNOSIS — I10 ESSENTIAL HYPERTENSION: ICD-10-CM

## 2023-06-08 DIAGNOSIS — G31.84 MILD COGNITIVE IMPAIRMENT: ICD-10-CM

## 2023-06-08 NOTE — PROGRESS NOTES
The ABCs of the Annual Wellness Visit  Subsequent Medicare Wellness Visit    Subjective    Jennifer Sprague is a 82 y.o. female who presents for a Subsequent Medicare Wellness Visit.    The following portions of the patient's history were reviewed and   updated as appropriate: allergies, current medications, past family history, past medical history, past social history, past surgical history, and problem list.    Compared to one year ago, the patient feels her physical   health is the same.    Compared to one year ago, the patient feels her mental   health is the same.    Recent Hospitalizations:  She was not admitted to the hospital during the last year.       Current Medical Providers:  Patient Care Team:  Aimee Duque DO as PCP - General (Family Medicine)    Outpatient Medications Prior to Visit   Medication Sig Dispense Refill    alendronate (FOSAMAX) 70 MG tablet Take 1 tablet by mouth Every 7 (Seven) Days. 12 tablet 3    atenolol (TENORMIN) 25 MG tablet Take 1 tablet by mouth Daily. 90 tablet 3    CBD (cannabidiol) oral oil Take  by mouth.      donepezil (ARICEPT) 10 MG tablet TAKE 1 TABLET EVERY NIGHT 90 tablet 3    Multiple Vitamins-Minerals (MULTIVITAMIN ADULT PO) Take  by mouth.      triamcinolone (KENALOG) 0.1 % cream APPLY TO THE APPROPRIATE AREA(S) TOPICALLY AS DIRECTED TWICE DAILY 45 g 0    cephalexin (Keflex) 500 MG capsule Take 1 capsule by mouth 3 (Three) Times a Day. (Patient not taking: Reported on 6/8/2023) 30 capsule 0     No facility-administered medications prior to visit.       No opioid medication identified on active medication list. I have reviewed chart for other potential  high risk medication/s and harmful drug interactions in the elderly.        Aspirin is not on active medication list.  Aspirin use is not indicated based on review of current medical condition/s. Risk of harm outweighs potential benefits.  .    Patient Active Problem List   Diagnosis    Hypercholesterolemia     "Hypertension    Osteopenia    History of colon polyps    Encounter for long-term (current) use of medications    Eczema     Advance Care Planning   Advance Care Planning     Advance Directive is not on file.  ACP discussion was held with the patient during this visit. Patient has an advance directive (not in EMR), copy requested.     Objective    Vitals:    23 1401   BP: 134/71   Pulse: 68   Temp: 97.7 °F (36.5 °C)   SpO2: 97%   Weight: 49.7 kg (109 lb 9.6 oz)   Height: 160 cm (63\")     Estimated body mass index is 19.41 kg/m² as calculated from the following:    Height as of this encounter: 160 cm (63\").    Weight as of this encounter: 49.7 kg (109 lb 9.6 oz).    BMI is within normal parameters. No other follow-up for BMI required.      Does the patient have evidence of cognitive impairment? Yes          HEALTH RISK ASSESSMENT    Smoking Status:  Social History     Tobacco Use   Smoking Status Former   Smokeless Tobacco Never     Alcohol Consumption:  Social History     Substance and Sexual Activity   Alcohol Use Yes    Comment: very rarely     Fall Risk Screen:    STEADI Fall Risk Assessment was completed, and patient is at LOW risk for falls.Assessment completed on:2023    Depression Screenin/8/2023     2:01 PM   PHQ-2/PHQ-9 Depression Screening   Little Interest or Pleasure in Doing Things 0-->not at all   Feeling Down, Depressed or Hopeless 0-->not at all   PHQ-9: Brief Depression Severity Measure Score 0       Health Habits and Functional and Cognitive Screenin/8/2023     1:59 PM   Functional & Cognitive Status   Do you have difficulty preparing food and eating? No   Do you have difficulty bathing yourself, getting dressed or grooming yourself? No   Do you have difficulty using the toilet? No   Do you have trouble with steps or getting out of a bed or a chair? No   Current Diet Well Balanced Diet   Dental Exam Up to date   Eye Exam Up to date   Exercise (times per week) 7 times per " week   Current Exercises Include Walking   Do you need help using the phone?  No   Are you deaf or do you have serious difficulty hearing?  Yes   Do you need help with transportation? No   Do you need help shopping? No   Do you need help preparing meals?  No   Do you need help with housework?  No   Do you need help with laundry? No   Do you need help taking your medications? No   Do you need help managing money? No   Do you ever drive or ride in a car without wearing a seat belt? No       Age-appropriate Screening Schedule:  Refer to the list below for future screening recommendations based on patient's age, sex and/or medical conditions. Orders for these recommended tests are listed in the plan section. The patient has been provided with a written plan.    Health Maintenance   Topic Date Due    Pneumococcal Vaccine 65+ (1 - PCV) 08/13/2005    DXA SCAN  03/20/2019    COVID-19 Vaccine (5 - Moderna series) 09/08/2022    TDAP/TD VACCINES (2 - Td or Tdap) 11/01/2022    INFLUENZA VACCINE  08/01/2023    ANNUAL WELLNESS VISIT  06/08/2024    ZOSTER VACCINE  Completed    MAMMOGRAM  Discontinued    LIPID PANEL  Discontinued    COLORECTAL CANCER SCREENING  Discontinued                  CMS Preventative Services Quick Reference  Risk Factors Identified During Encounter  Immunizations Discussed/Encouraged: Influenza, Prevnar 20 (Pneumococcal 20-valent conjugate), Shingrix, and COVID19  Dental Screening Recommended  Vision Screening Recommended  The above risks/problems have been discussed with the patient.  Pertinent information has been shared with the patient in the After Visit Summary.  An After Visit Summary and PPPS were made available to the patient.    Follow Up:   Next Medicare Wellness visit to be scheduled in 1 year.       Additional E&M Note during same encounter follows:  Patient has multiple medical problems which are significant and separately identifiable that require additional work above and beyond the Medicare  "Wellness Visit.      Chief Complaint  Medicare Wellness-subsequent    Subjective        HPI  Jennifer Sprague is also being seen today for follow-up on mild cognitive impairment.  She is taking donepezil 10 mg daily.  She has not had any side effects of the medication. She has noticed improvement in memory since starting it.    Patient is here to follow-up on osteopenia.  11/2019 we started the patient back on Fosamax weekly.  She states that she has not had any progression of back pain.  She is feeling pretty well.      Hypertension.  Her home blood pressure readings have been good and she is compliant with medications.  She denies any side effects.    Osteoporosis.  The patient has been taking Fosamax 70 mg weekly since about September 2019.  Initially we had started it because she was having some back pain associated with the finding of osteoporosis in the spine.       Objective   Vital Signs:  /71   Pulse 68   Temp 97.7 °F (36.5 °C)   Ht 160 cm (63\")   Wt 49.7 kg (109 lb 9.6 oz)   SpO2 97%   BMI 19.41 kg/m²     Physical Exam  Vitals and nursing note reviewed.   Constitutional:       Appearance: Normal appearance. She is well-developed and normal weight.   HENT:      Head: Normocephalic and atraumatic.      Right Ear: External ear normal.      Left Ear: External ear normal.      Nose: Nose normal.   Eyes:      General: No scleral icterus.     Conjunctiva/sclera: Conjunctivae normal.   Cardiovascular:      Rate and Rhythm: Normal rate and regular rhythm.      Heart sounds: Normal heart sounds.   Pulmonary:      Effort: Pulmonary effort is normal.      Breath sounds: Normal breath sounds.   Musculoskeletal:      Cervical back: Normal range of motion and neck supple.      Right lower leg: No edema.      Left lower leg: No edema.   Lymphadenopathy:      Cervical: No cervical adenopathy.   Skin:     General: Skin is warm and dry.      Findings: No rash.   Neurological:      Mental Status: She is alert and " oriented to person, place, and time.   Psychiatric:         Mood and Affect: Mood normal.         Behavior: Behavior normal.         Thought Content: Thought content normal.         Judgment: Judgment normal.        The following data was reviewed by: Aimee Duque DO on 06/08/2023:                 Assessment and Plan   Diagnoses and all orders for this visit:    1. Medicare annual wellness visit, subsequent (Primary)    2. Mild cognitive impairment    3. Essential hypertension  -     Comprehensive Metabolic Panel    4. Age-related osteoporosis without current pathological fracture  -     DEXA Bone Density Axial; Future    5. Encounter for long-term (current) use of medications  -     CBC & Differential  -     Comprehensive Metabolic Panel  -     TSH    6. Menopause  -     DEXA Bone Density Axial; Future    RHM.  DEXA scan was ordered.  All other screening is up-to-date.    Patient is also here to follow-up on chronic stable mild cognitive impairment, hypertension, and osteoporosis.  Surveillance labs were obtained today and any medication changes will be made based on lab results and will be called to the patient later this week.          Follow Up   Return in about 1 year (around 6/8/2024) for Medicare Wellness, HTN.  Patient was given instructions and counseling regarding her condition or for health maintenance advice. Please see specific information pulled into the AVS if appropriate.

## 2023-06-09 LAB
ALBUMIN SERPL-MCNC: 4 G/DL (ref 3.6–4.6)
ALBUMIN/GLOB SERPL: 1.9 {RATIO} (ref 1.2–2.2)
ALP SERPL-CCNC: 92 IU/L (ref 44–121)
ALT SERPL-CCNC: 7 IU/L (ref 0–32)
AST SERPL-CCNC: 16 IU/L (ref 0–40)
BASOPHILS # BLD AUTO: 0 X10E3/UL (ref 0–0.2)
BASOPHILS NFR BLD AUTO: 0 %
BILIRUB SERPL-MCNC: 0.3 MG/DL (ref 0–1.2)
BUN SERPL-MCNC: 8 MG/DL (ref 8–27)
BUN/CREAT SERPL: 10 (ref 12–28)
CALCIUM SERPL-MCNC: 9.2 MG/DL (ref 8.7–10.3)
CHLORIDE SERPL-SCNC: 99 MMOL/L (ref 96–106)
CO2 SERPL-SCNC: 24 MMOL/L (ref 20–29)
CREAT SERPL-MCNC: 0.8 MG/DL (ref 0.57–1)
EGFRCR SERPLBLD CKD-EPI 2021: 74 ML/MIN/1.73
EOSINOPHIL # BLD AUTO: 0 X10E3/UL (ref 0–0.4)
EOSINOPHIL NFR BLD AUTO: 0 %
ERYTHROCYTE [DISTWIDTH] IN BLOOD BY AUTOMATED COUNT: 13 % (ref 11.7–15.4)
GLOBULIN SER CALC-MCNC: 2.1 G/DL (ref 1.5–4.5)
GLUCOSE SERPL-MCNC: 86 MG/DL (ref 70–99)
HCT VFR BLD AUTO: 39 % (ref 34–46.6)
HGB BLD-MCNC: 12.9 G/DL (ref 11.1–15.9)
IMM GRANULOCYTES # BLD AUTO: 0 X10E3/UL (ref 0–0.1)
IMM GRANULOCYTES NFR BLD AUTO: 0 %
LYMPHOCYTES # BLD AUTO: 2.7 X10E3/UL (ref 0.7–3.1)
LYMPHOCYTES NFR BLD AUTO: 41 %
MCH RBC QN AUTO: 31.6 PG (ref 26.6–33)
MCHC RBC AUTO-ENTMCNC: 33.1 G/DL (ref 31.5–35.7)
MCV RBC AUTO: 96 FL (ref 79–97)
MONOCYTES # BLD AUTO: 0.4 X10E3/UL (ref 0.1–0.9)
MONOCYTES NFR BLD AUTO: 6 %
NEUTROPHILS # BLD AUTO: 3.4 X10E3/UL (ref 1.4–7)
NEUTROPHILS NFR BLD AUTO: 53 %
PLATELET # BLD AUTO: 230 X10E3/UL (ref 150–450)
POTASSIUM SERPL-SCNC: 4.9 MMOL/L (ref 3.5–5.2)
PROT SERPL-MCNC: 6.1 G/DL (ref 6–8.5)
RBC # BLD AUTO: 4.08 X10E6/UL (ref 3.77–5.28)
SODIUM SERPL-SCNC: 137 MMOL/L (ref 134–144)
TSH SERPL DL<=0.005 MIU/L-ACNC: 1.82 UIU/ML (ref 0.45–4.5)
WBC # BLD AUTO: 6.5 X10E3/UL (ref 3.4–10.8)

## 2023-06-15 ENCOUNTER — TELEPHONE (OUTPATIENT)
Dept: FAMILY MEDICINE CLINIC | Facility: CLINIC | Age: 83
End: 2023-06-15
Payer: MEDICARE

## 2023-06-15 NOTE — TELEPHONE ENCOUNTER
Left VM for pt per verbal release about overdue Dexa scan.  Left number to call and schedule.  I will postpone for 1 month

## 2023-07-23 ENCOUNTER — HOSPITAL ENCOUNTER (OUTPATIENT)
Dept: BONE DENSITY | Facility: HOSPITAL | Age: 83
Discharge: HOME | End: 2023-07-23
Admitting: FAMILY MEDICINE
Payer: MEDICARE

## 2023-07-23 DIAGNOSIS — Z78.0 MENOPAUSE: ICD-10-CM

## 2023-07-23 DIAGNOSIS — M81.0 AGE-RELATED OSTEOPOROSIS WITHOUT CURRENT PATHOLOGICAL FRACTURE: ICD-10-CM

## 2023-07-23 PROCEDURE — 77080 DXA BONE DENSITY AXIAL: CPT

## 2024-06-10 ENCOUNTER — OFFICE VISIT (OUTPATIENT)
Dept: FAMILY MEDICINE CLINIC | Facility: CLINIC | Age: 84
End: 2024-06-10
Payer: MEDICARE

## 2024-06-10 VITALS
WEIGHT: 114.6 LBS | HEIGHT: 64 IN | HEART RATE: 71 BPM | TEMPERATURE: 98.3 F | OXYGEN SATURATION: 99 % | DIASTOLIC BLOOD PRESSURE: 70 MMHG | BODY MASS INDEX: 19.56 KG/M2 | SYSTOLIC BLOOD PRESSURE: 118 MMHG

## 2024-06-10 DIAGNOSIS — G31.84 MILD COGNITIVE IMPAIRMENT: ICD-10-CM

## 2024-06-10 DIAGNOSIS — Z79.899 ENCOUNTER FOR LONG-TERM (CURRENT) USE OF MEDICATIONS: ICD-10-CM

## 2024-06-10 DIAGNOSIS — I10 PRIMARY HYPERTENSION: ICD-10-CM

## 2024-06-10 DIAGNOSIS — M85.80 OSTEOPENIA, UNSPECIFIED LOCATION: ICD-10-CM

## 2024-06-10 DIAGNOSIS — M81.6 LOCALIZED OSTEOPOROSIS WITHOUT CURRENT PATHOLOGICAL FRACTURE: ICD-10-CM

## 2024-06-10 DIAGNOSIS — Z00.00 MEDICARE ANNUAL WELLNESS VISIT, SUBSEQUENT: Primary | ICD-10-CM

## 2024-06-10 DIAGNOSIS — I10 ESSENTIAL HYPERTENSION: ICD-10-CM

## 2024-06-10 PROCEDURE — 3074F SYST BP LT 130 MM HG: CPT | Performed by: FAMILY MEDICINE

## 2024-06-10 PROCEDURE — 1159F MED LIST DOCD IN RCRD: CPT | Performed by: FAMILY MEDICINE

## 2024-06-10 PROCEDURE — 1160F RVW MEDS BY RX/DR IN RCRD: CPT | Performed by: FAMILY MEDICINE

## 2024-06-10 PROCEDURE — 3078F DIAST BP <80 MM HG: CPT | Performed by: FAMILY MEDICINE

## 2024-06-10 PROCEDURE — G0439 PPPS, SUBSEQ VISIT: HCPCS | Performed by: FAMILY MEDICINE

## 2024-06-10 PROCEDURE — 1170F FXNL STATUS ASSESSED: CPT | Performed by: FAMILY MEDICINE

## 2024-06-10 PROCEDURE — 99214 OFFICE O/P EST MOD 30 MIN: CPT | Performed by: FAMILY MEDICINE

## 2024-06-10 RX ORDER — DONEPEZIL HYDROCHLORIDE 10 MG/1
10 TABLET, FILM COATED ORAL NIGHTLY
Qty: 90 TABLET | Refills: 3 | Status: SHIPPED | OUTPATIENT
Start: 2024-06-10

## 2024-06-10 RX ORDER — ALENDRONATE SODIUM 70 MG/1
70 TABLET ORAL
Qty: 12 TABLET | Refills: 3 | Status: SHIPPED | OUTPATIENT
Start: 2024-06-10

## 2024-06-10 RX ORDER — ATENOLOL 25 MG/1
25 TABLET ORAL DAILY
Qty: 90 TABLET | Refills: 3 | Status: SHIPPED | OUTPATIENT
Start: 2024-06-10

## 2024-06-10 NOTE — PROGRESS NOTES
The ABCs of the Annual Wellness Visit  Subsequent Medicare Wellness Visit    Subjective    Jennifer Sprague is a 83 y.o. female who presents for a Subsequent Medicare Wellness Visit.    The following portions of the patient's history were reviewed and   updated as appropriate: allergies, current medications, past family history, past medical history, past social history, past surgical history, and problem list.    Compared to one year ago, the patient feels her physical   health is the same.    Compared to one year ago, the patient feels her mental   health is the same.    Recent Hospitalizations:  She was not admitted to the hospital during the last year.       Current Medical Providers:  Patient Care Team:  Aimee Duque,  as PCP - General (Family Medicine)    Outpatient Medications Prior to Visit   Medication Sig Dispense Refill    CBD (cannabidiol) oral oil Take  by mouth.      Multiple Vitamins-Minerals (MULTIVITAMIN ADULT PO) Take  by mouth.      triamcinolone (KENALOG) 0.1 % cream APPLY TO THE APPROPRIATE AREA(S) TOPICALLY AS DIRECTED TWICE DAILY 45 g 0    alendronate (FOSAMAX) 70 MG tablet Take 1 tablet by mouth Every 7 (Seven) Days. 12 tablet 3    atenolol (TENORMIN) 25 MG tablet Take 1 tablet by mouth Daily. 90 tablet 3    donepezil (ARICEPT) 10 MG tablet TAKE 1 TABLET EVERY NIGHT 90 tablet 3     No facility-administered medications prior to visit.       No opioid medication identified on active medication list. I have reviewed chart for other potential  high risk medication/s and harmful drug interactions in the elderly.        Aspirin is not on active medication list.  Aspirin use is not indicated based on review of current medical condition/s. Risk of harm outweighs potential benefits.  .    Patient Active Problem List   Diagnosis    Hypercholesterolemia    Hypertension    Osteopenia    History of colon polyps    Encounter for long-term (current) use of medications    Eczema     Advance Care  "Planning   Advance Care Planning     Advance Directive is not on file.  ACP discussion was held with the patient during this visit. Patient has an advance directive (not in EMR), copy requested.     Objective    Vitals:    06/10/24 1404   BP: 118/70   Pulse: 71   Temp: 98.3 °F (36.8 °C)   SpO2: 99%   Weight: 52 kg (114 lb 9.6 oz)   Height: 162.6 cm (64\")     Estimated body mass index is 19.67 kg/m² as calculated from the following:    Height as of this encounter: 162.6 cm (64\").    Weight as of this encounter: 52 kg (114 lb 9.6 oz).    BMI is within normal parameters. No other follow-up for BMI required.      Does the patient have evidence of cognitive impairment? Yes.  MCI on donepezil          HEALTH RISK ASSESSMENT    Smoking Status:  Social History     Tobacco Use   Smoking Status Former   Smokeless Tobacco Never     Alcohol Consumption:  Social History     Substance and Sexual Activity   Alcohol Use Yes    Comment: very rarely     Fall Risk Screen:    [facility] Fall Risk Assessment was completed, and patient is at LOW risk for falls.Assessment completed on:6/10/2024    Depression Screenin/10/2024     2:02 PM   PHQ-2/PHQ-9 Depression Screening   Little Interest or Pleasure in Doing Things 0-->not at all   Feeling Down, Depressed or Hopeless 0-->not at all   PHQ-9: Brief Depression Severity Measure Score 0       Health Habits and Functional and Cognitive Screenin/10/2024     2:02 PM   Functional & Cognitive Status   Do you have difficulty preparing food and eating? No   Do you have difficulty bathing yourself, getting dressed or grooming yourself? No   Do you have difficulty using the toilet? No   Do you have difficulty moving around from place to place? No   Do you have trouble with steps or getting out of a bed or a chair? No   Current Diet Well Balanced Diet   Dental Exam Up to date   Eye Exam Up to date   Exercise (times per week) 4 times per week   Current Exercises Include Walking;House " Cleaning   Do you need help using the phone?  No   Are you deaf or do you have serious difficulty hearing?  No   Do you need help to go to places out of walking distance? No   Do you need help shopping? No   Do you need help preparing meals?  No   Do you need help with housework?  No   Do you need help with laundry? No   Do you need help taking your medications? No   Do you need help managing money? No   Do you ever drive or ride in a car without wearing a seat belt? No   Have you felt unusual stress, anger or loneliness in the last month? No       Age-appropriate Screening Schedule:  Refer to the list below for future screening recommendations based on patient's age, sex and/or medical conditions. Orders for these recommended tests are listed in the plan section. The patient has been provided with a written plan.    Health Maintenance   Topic Date Due    RSV Vaccine - Adults (1 - 1-dose 60+ series) Never done    Pneumococcal Vaccine 65+ (1 of 1 - PCV) 08/13/2005    TDAP/TD VACCINES (2 - Td or Tdap) 11/01/2022    COVID-19 Vaccine (5 - 2023-24 season) 09/01/2023    INFLUENZA VACCINE  08/01/2024    ANNUAL WELLNESS VISIT  06/10/2025    DXA SCAN  07/23/2025    ZOSTER VACCINE  Completed    LIPID PANEL  Discontinued    COLORECTAL CANCER SCREENING  Discontinued                  CMS Preventative Services Quick Reference  Risk Factors Identified During Encounter  Immunizations Discussed/Encouraged: Influenza, Prevnar 20 (Pneumococcal 20-valent conjugate), Shingrix, COVID19, and RSV (Respiratory Syncytial Virus)  Dental Screening Recommended  Vision Screening Recommended  The above risks/problems have been discussed with the patient.  Pertinent information has been shared with the patient in the After Visit Summary.  An After Visit Summary and PPPS were made available to the patient.    Follow Up:   Next Medicare Wellness visit to be scheduled in 1 year.       Additional E&M Note during same encounter follows:  Patient has  "multiple medical problems which are significant and separately identifiable that require additional work above and beyond the Medicare Wellness Visit.      Chief Complaint  Medicare Wellness-subsequent, Hypertension, and Hyperlipidemia    Subjective        HPI  Jennifer Sprague is also being seen today for follow-up on the following chronic health conditions:    Mild cognitive impairment.  She is taking donepezil 10 mg daily.  She has not had any side effects of the medication. She has noticed improvement in memory since starting it.      Hypertension.  Her home blood pressure readings have been good and she is compliant with atenolol.  She denies any side effects.    Osteoporosis.  The patient has been taking Fosamax 70 mg weekly since about September 2019.  Initially we had started it because she was having some back pain associated with the finding of osteoporosis in the spine.         Objective   Vital Signs:  /70   Pulse 71   Temp 98.3 °F (36.8 °C)   Ht 162.6 cm (64\")   Wt 52 kg (114 lb 9.6 oz)   SpO2 99%   BMI 19.67 kg/m²     Physical Exam  Vitals and nursing note reviewed.   Constitutional:       Appearance: Normal appearance. She is well-developed and normal weight.   HENT:      Head: Normocephalic and atraumatic.      Right Ear: Tympanic membrane, ear canal and external ear normal.      Left Ear: Tympanic membrane, ear canal and external ear normal.   Eyes:      Conjunctiva/sclera: Conjunctivae normal.   Cardiovascular:      Rate and Rhythm: Normal rate and regular rhythm.      Heart sounds: Normal heart sounds.   Pulmonary:      Effort: Pulmonary effort is normal.      Breath sounds: Normal breath sounds.   Musculoskeletal:         General: Normal range of motion.      Cervical back: Normal range of motion and neck supple.   Skin:     General: Skin is warm and dry.      Capillary Refill: Capillary refill takes less than 2 seconds.      Findings: No rash.   Neurological:      Mental Status: She " is alert and oriented to person, place, and time.   Psychiatric:         Mood and Affect: Mood normal.         Behavior: Behavior normal.         Thought Content: Thought content normal.         Judgment: Judgment normal.          The following data was reviewed by: Aimee Duque DO on 06/10/2024:                 Assessment and Plan   Diagnoses and all orders for this visit:    1. Medicare annual wellness visit, subsequent (Primary)    2. Primary hypertension  -     Comprehensive Metabolic Panel    3. Mild cognitive impairment  -     donepezil (ARICEPT) 10 MG tablet; Take 1 tablet by mouth Every Night.  Dispense: 90 tablet; Refill: 3    4. Localized osteoporosis without current pathological fracture    5. Encounter for long-term (current) use of medications  -     TSH  -     CBC & Differential  -     Comprehensive Metabolic Panel    6. Essential hypertension  -     atenolol (TENORMIN) 25 MG tablet; Take 1 tablet by mouth Daily.  Dispense: 90 tablet; Refill: 3    7. Osteopenia, unspecified location  -     alendronate (FOSAMAX) 70 MG tablet; Take 1 tablet by mouth Every 7 (Seven) Days.  Dispense: 12 tablet; Refill: 3    RHM.  Up to date.      Patient is also here to follow-up on chronic stable hypertension, mild cognitive impairment, and osteoporosis.  Surveillance labs were obtained today and any medication changes will be made based on lab results and will be called to the patient later this week.          Follow Up   Return in about 1 year (around 6/10/2025) for Medicare Wellness.  Patient was given instructions and counseling regarding her condition or for health maintenance advice. Please see specific information pulled into the AVS if appropriate.

## 2024-06-12 LAB
ALBUMIN SERPL-MCNC: 3.8 G/DL (ref 3.5–5.2)
ALBUMIN/GLOB SERPL: 1.8 G/DL
ALP SERPL-CCNC: 106 U/L (ref 39–117)
ALT SERPL-CCNC: 8 U/L (ref 1–33)
AST SERPL-CCNC: 16 U/L (ref 1–32)
BASOPHILS # BLD AUTO: 0.02 10*3/MM3 (ref 0–0.2)
BASOPHILS NFR BLD AUTO: 0.4 % (ref 0–1.5)
BILIRUB SERPL-MCNC: 0.4 MG/DL (ref 0–1.2)
BUN SERPL-MCNC: 6 MG/DL (ref 8–23)
BUN/CREAT SERPL: 7.4 (ref 7–25)
CALCIUM SERPL-MCNC: 9 MG/DL (ref 8.6–10.5)
CHLORIDE SERPL-SCNC: 101 MMOL/L (ref 98–107)
CO2 SERPL-SCNC: 25.6 MMOL/L (ref 22–29)
CREAT SERPL-MCNC: 0.81 MG/DL (ref 0.57–1)
EGFRCR SERPLBLD CKD-EPI 2021: 72.1 ML/MIN/1.73
EOSINOPHIL # BLD AUTO: 0.06 10*3/MM3 (ref 0–0.4)
EOSINOPHIL NFR BLD AUTO: 1.1 % (ref 0.3–6.2)
ERYTHROCYTE [DISTWIDTH] IN BLOOD BY AUTOMATED COUNT: 12.9 % (ref 12.3–15.4)
GLOBULIN SER CALC-MCNC: 2.1 GM/DL
GLUCOSE SERPL-MCNC: 83 MG/DL (ref 65–99)
HCT VFR BLD AUTO: 41.6 % (ref 34–46.6)
HGB BLD-MCNC: 13.8 G/DL (ref 12–15.9)
IMM GRANULOCYTES # BLD AUTO: 0.01 10*3/MM3 (ref 0–0.05)
IMM GRANULOCYTES NFR BLD AUTO: 0.2 % (ref 0–0.5)
LYMPHOCYTES # BLD AUTO: 2.29 10*3/MM3 (ref 0.7–3.1)
LYMPHOCYTES NFR BLD AUTO: 40.2 % (ref 19.6–45.3)
MCH RBC QN AUTO: 31.8 PG (ref 26.6–33)
MCHC RBC AUTO-ENTMCNC: 33.2 G/DL (ref 31.5–35.7)
MCV RBC AUTO: 95.9 FL (ref 79–97)
MONOCYTES # BLD AUTO: 0.43 10*3/MM3 (ref 0.1–0.9)
MONOCYTES NFR BLD AUTO: 7.5 % (ref 5–12)
NEUTROPHILS # BLD AUTO: 2.89 10*3/MM3 (ref 1.7–7)
NEUTROPHILS NFR BLD AUTO: 50.6 % (ref 42.7–76)
NRBC BLD AUTO-RTO: 0 /100 WBC (ref 0–0.2)
PLATELET # BLD AUTO: 219 10*3/MM3 (ref 140–450)
POTASSIUM SERPL-SCNC: 4.7 MMOL/L (ref 3.5–5.2)
PROT SERPL-MCNC: 5.9 G/DL (ref 6–8.5)
RBC # BLD AUTO: 4.34 10*6/MM3 (ref 3.77–5.28)
SODIUM SERPL-SCNC: 136 MMOL/L (ref 136–145)
TSH SERPL DL<=0.005 MIU/L-ACNC: 2.42 UIU/ML (ref 0.27–4.2)
WBC # BLD AUTO: 5.7 10*3/MM3 (ref 3.4–10.8)

## 2024-06-21 ENCOUNTER — OFFICE VISIT (OUTPATIENT)
Dept: FAMILY MEDICINE CLINIC | Facility: CLINIC | Age: 84
End: 2024-06-21
Payer: MEDICARE

## 2024-06-21 VITALS
WEIGHT: 114.4 LBS | SYSTOLIC BLOOD PRESSURE: 100 MMHG | OXYGEN SATURATION: 98 % | HEART RATE: 68 BPM | BODY MASS INDEX: 19.53 KG/M2 | DIASTOLIC BLOOD PRESSURE: 74 MMHG | HEIGHT: 64 IN

## 2024-06-21 DIAGNOSIS — N39.0 URINARY TRACT INFECTION WITHOUT HEMATURIA, SITE UNSPECIFIED: ICD-10-CM

## 2024-06-21 DIAGNOSIS — R30.0 DYSURIA: Primary | ICD-10-CM

## 2024-06-21 LAB
BILIRUB BLD-MCNC: ABNORMAL MG/DL
CLARITY, POC: ABNORMAL
COLOR UR: ABNORMAL
GLUCOSE UR STRIP-MCNC: NEGATIVE MG/DL
KETONES UR QL: NEGATIVE
LEUKOCYTE EST, POC: ABNORMAL
NITRITE UR-MCNC: POSITIVE MG/ML
PH UR: 6 [PH] (ref 5–8)
PROT UR STRIP-MCNC: NEGATIVE MG/DL
RBC # UR STRIP: NEGATIVE /UL
SP GR UR: 1.02 (ref 1–1.03)
UROBILINOGEN UR QL: NORMAL

## 2024-06-21 PROCEDURE — 3074F SYST BP LT 130 MM HG: CPT | Performed by: NURSE PRACTITIONER

## 2024-06-21 PROCEDURE — 3078F DIAST BP <80 MM HG: CPT | Performed by: NURSE PRACTITIONER

## 2024-06-21 PROCEDURE — 99213 OFFICE O/P EST LOW 20 MIN: CPT | Performed by: NURSE PRACTITIONER

## 2024-06-21 PROCEDURE — 81002 URINALYSIS NONAUTO W/O SCOPE: CPT | Performed by: NURSE PRACTITIONER

## 2024-06-21 RX ORDER — CEPHALEXIN 500 MG/1
500 CAPSULE ORAL 2 TIMES DAILY
Qty: 14 CAPSULE | Refills: 0 | Status: SHIPPED | OUTPATIENT
Start: 2024-06-21

## 2024-06-21 NOTE — PROGRESS NOTES
"Chief Complaint  Urinary Tract Infection (Uti symptoms took some otc meds)    Subjective        Jennifer Sprague presents to [facility]  History of Present Illness    Is a patient of Dr. Aimee Duque.  She presents today with complaint of UTI symptoms that started 3 days or so.      Started azo last night    Objective   Vital Signs:  /74   Pulse 68   Ht 162.6 cm (64.02\")   Wt 51.9 kg (114 lb 6.4 oz)   SpO2 98%   BMI 19.63 kg/m²   Estimated body mass index is 19.63 kg/m² as calculated from the following:    Height as of this encounter: 162.6 cm (64.02\").    Weight as of this encounter: 51.9 kg (114 lb 6.4 oz).       BMI is within normal parameters. No other follow-up for BMI required.      Physical Exam  Constitutional:       Appearance: Normal appearance.   Neurological:      Mental Status: She is alert and oriented to person, place, and time.   Psychiatric:         Mood and Affect: Mood normal.         Behavior: Behavior normal.         Thought Content: Thought content normal.         Judgment: Judgment normal.        Result Review :            Brief Urine Lab Results  (Last result in the past 365 days)        Color   Clarity   Blood   Leuk Est   Nitrite   Protein   CREAT   Urine HCG        06/21/24 1359 Pender   Hazy   Negative   Large (3+)   Positive   Negative                          Assessment and Plan     Diagnoses and all orders for this visit:    1. Dysuria (Primary)  -     POC Urinalysis Dipstick    2. Urinary tract infection without hematuria, site unspecified  -     Urine Culture - Urine, Urine, Clean Catch    Other orders  -     cephalexin (Keflex) 500 MG capsule; Take 1 capsule by mouth 2 (Two) Times a Day.  Dispense: 14 capsule; Refill: 0      Start antibiotic.  Increase water intake.  Will culture urine and call with results and any changes needed to plan of care.           Follow Up     No follow-ups on file.  Patient was given instructions and counseling " regarding her condition or for health maintenance advice. Please see specific information pulled into the AVS if appropriate.

## 2024-06-25 LAB
BACTERIA UR CULT: ABNORMAL
BACTERIA UR CULT: ABNORMAL
OTHER ANTIBIOTIC SUSC ISLT: ABNORMAL

## 2025-03-06 ENCOUNTER — OFFICE VISIT (OUTPATIENT)
Dept: FAMILY MEDICINE CLINIC | Facility: CLINIC | Age: 85
End: 2025-03-06
Payer: MEDICARE

## 2025-03-06 VITALS
WEIGHT: 114.8 LBS | HEIGHT: 64 IN | BODY MASS INDEX: 19.6 KG/M2 | SYSTOLIC BLOOD PRESSURE: 150 MMHG | HEART RATE: 76 BPM | DIASTOLIC BLOOD PRESSURE: 88 MMHG | OXYGEN SATURATION: 97 %

## 2025-03-06 DIAGNOSIS — R63.0 DECREASED APPETITE: Primary | ICD-10-CM

## 2025-03-06 DIAGNOSIS — R53.83 FATIGUE, UNSPECIFIED TYPE: ICD-10-CM

## 2025-03-06 LAB
BILIRUB BLD-MCNC: ABNORMAL MG/DL
CLARITY, POC: ABNORMAL
COLOR UR: ABNORMAL
EXPIRATION DATE: ABNORMAL
GLUCOSE UR STRIP-MCNC: NEGATIVE MG/DL
KETONES UR QL: ABNORMAL
LEUKOCYTE EST, POC: ABNORMAL
Lab: ABNORMAL
NITRITE UR-MCNC: NEGATIVE MG/ML
PH UR: 6 [PH] (ref 5–8)
PROT UR STRIP-MCNC: ABNORMAL MG/DL
RBC # UR STRIP: NEGATIVE /UL
SP GR UR: 1.02 (ref 1–1.03)
UROBILINOGEN UR QL: ABNORMAL

## 2025-03-06 PROCEDURE — 3079F DIAST BP 80-89 MM HG: CPT | Performed by: FAMILY MEDICINE

## 2025-03-06 PROCEDURE — 1159F MED LIST DOCD IN RCRD: CPT | Performed by: FAMILY MEDICINE

## 2025-03-06 PROCEDURE — 1160F RVW MEDS BY RX/DR IN RCRD: CPT | Performed by: FAMILY MEDICINE

## 2025-03-06 PROCEDURE — 81003 URINALYSIS AUTO W/O SCOPE: CPT | Performed by: FAMILY MEDICINE

## 2025-03-06 PROCEDURE — 3077F SYST BP >= 140 MM HG: CPT | Performed by: FAMILY MEDICINE

## 2025-03-06 PROCEDURE — 99213 OFFICE O/P EST LOW 20 MIN: CPT | Performed by: FAMILY MEDICINE

## 2025-03-06 PROCEDURE — 93000 ELECTROCARDIOGRAM COMPLETE: CPT | Performed by: FAMILY MEDICINE

## 2025-03-06 NOTE — PROGRESS NOTES
"Chief Complaint  Anorexia    Subjective        Jennifer Sprague presents to [facility]  History of Present Illness  Patient is here today with a new concern.  Her daughter is also with her and explains that the patient recently had the flu about 10 days ago.  She started with cough, congestion, lack of energy, and decreased appetite.  Her other symptoms have improved but she is still fatigued and not eating much.  She is drinking water and staying well-hydrated.  Patient denies any chest pain or shortness of breath.  Patient denies any constipation or black or bloody stools.  No abdominal pain.  Patient did receive this season's flu vaccine.      Objective   Vital Signs:  /88   Pulse 76   Ht 162 cm (63.78\")   Wt 52.1 kg (114 lb 12.8 oz)   SpO2 97%   BMI 19.84 kg/m²   Estimated body mass index is 19.84 kg/m² as calculated from the following:    Height as of this encounter: 162 cm (63.78\").    Weight as of this encounter: 52.1 kg (114 lb 12.8 oz).    BMI is within normal parameters. No other follow-up for BMI required.      Physical Exam  Vitals and nursing note reviewed.   Constitutional:       Appearance: She is well-developed.   HENT:      Head: Normocephalic and atraumatic.      Right Ear: Ear canal and external ear normal. A middle ear effusion is present.      Left Ear: Ear canal and external ear normal. A middle ear effusion is present.      Nose: Nose normal.   Eyes:      General: No scleral icterus.     Conjunctiva/sclera: Conjunctivae normal.   Cardiovascular:      Rate and Rhythm: Normal rate and regular rhythm.      Heart sounds: Normal heart sounds.   Pulmonary:      Effort: Pulmonary effort is normal.      Breath sounds: Normal breath sounds.   Musculoskeletal:      Cervical back: Normal range of motion and neck supple.   Lymphadenopathy:      Cervical: No cervical adenopathy.   Skin:     General: Skin is warm and dry.      Findings: No rash.   Neurological:     "  Mental Status: She is alert and oriented to person, place, and time.   Psychiatric:         Mood and Affect: Mood normal.         Behavior: Behavior normal.         Thought Content: Thought content normal.         Judgment: Judgment normal.        Result Review :  The following data was reviewed by: Aimee Dquue DO on 03/06/2025:  Common labs          6/11/2024    11:10   Common Labs   Glucose 83    BUN 6    Creatinine 0.81    Sodium 136    Potassium 4.7    Chloride 101    Calcium 9.0    Albumin 3.8    Total Bilirubin 0.4    Alkaline Phosphatase 106    AST (SGOT) 16    ALT (SGPT) 8    WBC 5.70    Hemoglobin 13.8    Hematocrit 41.6    Platelets 219      TSH          6/11/2024    11:10   TSH   TSH 2.420           ECG 12 Lead    Date/Time: 3/6/2025 2:01 PM  Performed by: Aimee Duque DO    Authorized by: Aimee Duque DO  Previous ECG: no previous ECG available  Rhythm: sinus rhythm  Rate: normal  Conduction: conduction normal  QRS axis: normal  Other findings: non-specific ST-T wave changes    Clinical impression: normal ECG            Assessment and Plan   Diagnoses and all orders for this visit:    1. Decreased appetite (Primary)  -     Magnesium  -     CBC & Differential  -     Comprehensive Metabolic Panel  -     Lipase  -     POC Urinalysis Dipstick, Automated  -     ECG 12 Lead    2. Fatigue, unspecified type  -     Magnesium  -     CBC & Differential  -     Comprehensive Metabolic Panel  -     Lipase  -     POC Urinalysis Dipstick, Automated  -     ECG 12 Lead  -     Urine Culture - Urine, Urine, Clean Catch; Future  -     Urine Culture - Urine, Urine, Clean Catch    Patient is here today with decreased appetite and fatigue following the flu.  Physical exam is nonfocal.  EKG in office shows no acute findings and no arrhythmia.  Urinalysis shows leukocytes, but no blood or nitrites and the patient is without urinary symptoms.  A urine culture will be sent.  I will hold off on empiric treatment  until the urine culture comes back.  I will also check metabolic labs to see if there is any other cause for her symptoms.  Patient was advised to push fluids and follow-up if symptoms persist despite normal findings.  Patient should follow-up sooner if she develops any new or worsening symptoms.         Follow Up   Return if symptoms worsen or fail to improve, for Routine Follow Up.  Patient was given instructions and counseling regarding her condition or for health maintenance advice. Please see specific information pulled into the AVS if appropriate.

## 2025-03-07 LAB
ALBUMIN SERPL-MCNC: 4.1 G/DL (ref 3.7–4.7)
ALP SERPL-CCNC: 107 IU/L (ref 44–121)
ALT SERPL-CCNC: 32 IU/L (ref 0–32)
AST SERPL-CCNC: 23 IU/L (ref 0–40)
BASOPHILS # BLD AUTO: 0 X10E3/UL (ref 0–0.2)
BASOPHILS NFR BLD AUTO: 0 %
BILIRUB SERPL-MCNC: 0.9 MG/DL (ref 0–1.2)
BUN SERPL-MCNC: 11 MG/DL (ref 8–27)
BUN/CREAT SERPL: 13 (ref 12–28)
CALCIUM SERPL-MCNC: 9.3 MG/DL (ref 8.7–10.3)
CHLORIDE SERPL-SCNC: 101 MMOL/L (ref 96–106)
CO2 SERPL-SCNC: 21 MMOL/L (ref 20–29)
CREAT SERPL-MCNC: 0.88 MG/DL (ref 0.57–1)
EGFRCR SERPLBLD CKD-EPI 2021: 65 ML/MIN/1.73
EOSINOPHIL # BLD AUTO: 0 X10E3/UL (ref 0–0.4)
EOSINOPHIL NFR BLD AUTO: 0 %
ERYTHROCYTE [DISTWIDTH] IN BLOOD BY AUTOMATED COUNT: 13.3 % (ref 11.7–15.4)
GLOBULIN SER CALC-MCNC: 2.3 G/DL (ref 1.5–4.5)
GLUCOSE SERPL-MCNC: 107 MG/DL (ref 70–99)
HCT VFR BLD AUTO: 44.2 % (ref 34–46.6)
HGB BLD-MCNC: 14.7 G/DL (ref 11.1–15.9)
IMM GRANULOCYTES # BLD AUTO: 0 X10E3/UL (ref 0–0.1)
IMM GRANULOCYTES NFR BLD AUTO: 0 %
LIPASE SERPL-CCNC: 30 U/L (ref 14–85)
LYMPHOCYTES # BLD AUTO: 2.4 X10E3/UL (ref 0.7–3.1)
LYMPHOCYTES NFR BLD AUTO: 35 %
MAGNESIUM SERPL-MCNC: 2.2 MG/DL (ref 1.6–2.3)
MCH RBC QN AUTO: 31.4 PG (ref 26.6–33)
MCHC RBC AUTO-ENTMCNC: 33.3 G/DL (ref 31.5–35.7)
MCV RBC AUTO: 94 FL (ref 79–97)
MONOCYTES # BLD AUTO: 0.5 X10E3/UL (ref 0.1–0.9)
MONOCYTES NFR BLD AUTO: 7 %
NEUTROPHILS # BLD AUTO: 3.9 X10E3/UL (ref 1.4–7)
NEUTROPHILS NFR BLD AUTO: 58 %
PLATELET # BLD AUTO: 272 X10E3/UL (ref 150–450)
POTASSIUM SERPL-SCNC: 4.3 MMOL/L (ref 3.5–5.2)
PROT SERPL-MCNC: 6.4 G/DL (ref 6–8.5)
RBC # BLD AUTO: 4.68 X10E6/UL (ref 3.77–5.28)
SODIUM SERPL-SCNC: 139 MMOL/L (ref 134–144)
WBC # BLD AUTO: 6.9 X10E3/UL (ref 3.4–10.8)

## 2025-03-08 LAB
BACTERIA UR CULT: ABNORMAL

## 2025-03-11 ENCOUNTER — OFFICE VISIT (OUTPATIENT)
Dept: FAMILY MEDICINE CLINIC | Facility: CLINIC | Age: 85
End: 2025-03-11
Payer: MEDICARE

## 2025-03-11 VITALS
HEIGHT: 64 IN | OXYGEN SATURATION: 99 % | WEIGHT: 112.8 LBS | BODY MASS INDEX: 19.26 KG/M2 | SYSTOLIC BLOOD PRESSURE: 112 MMHG | HEART RATE: 109 BPM | DIASTOLIC BLOOD PRESSURE: 79 MMHG

## 2025-03-11 DIAGNOSIS — R11.0 NAUSEA: ICD-10-CM

## 2025-03-11 DIAGNOSIS — R63.0 DECREASED APPETITE: Primary | ICD-10-CM

## 2025-03-11 DIAGNOSIS — R53.83 FATIGUE, UNSPECIFIED TYPE: ICD-10-CM

## 2025-03-11 PROCEDURE — 3074F SYST BP LT 130 MM HG: CPT | Performed by: FAMILY MEDICINE

## 2025-03-11 PROCEDURE — 99213 OFFICE O/P EST LOW 20 MIN: CPT | Performed by: FAMILY MEDICINE

## 2025-03-11 PROCEDURE — 3078F DIAST BP <80 MM HG: CPT | Performed by: FAMILY MEDICINE

## 2025-03-11 PROCEDURE — 1159F MED LIST DOCD IN RCRD: CPT | Performed by: FAMILY MEDICINE

## 2025-03-11 PROCEDURE — 1160F RVW MEDS BY RX/DR IN RCRD: CPT | Performed by: FAMILY MEDICINE

## 2025-03-11 NOTE — PROGRESS NOTES
"Chief Complaint  Anorexia    Subjective        Jennifer Sprague presents to [facility]  History of Present Illness  Patient is here today with her daughter who supplies most of the history due to patient's dementia.  When asked how the patient is today the patient responds \"good\".  However, they daughter explains that she has been eating very little still.  Yesterday she had half of a protein drink and a couple of crackers.  She has complained of nausea and lack of appetite.  It is unknown if she is having bowel movements.  The patient has not been complaining of pain and has had no vomiting.  Patient has had no fever or chills.  She still has a slight cough from the flu.  She has also been sleeping a little more than usual.  Patient still does not have any urinary symptoms.      Objective   Vital Signs:  /79   Pulse 109   Ht 162 cm (63.78\")   Wt 51.2 kg (112 lb 12.8 oz)   SpO2 99%   BMI 19.50 kg/m²   Estimated body mass index is 19.5 kg/m² as calculated from the following:    Height as of this encounter: 162 cm (63.78\").    Weight as of this encounter: 51.2 kg (112 lb 12.8 oz).    BMI is within normal parameters. No other follow-up for BMI required.      Physical Exam  Vitals and nursing note reviewed.   Constitutional:       Appearance: Normal appearance. She is well-developed and normal weight.   HENT:      Head: Normocephalic and atraumatic.   Eyes:      General: No scleral icterus.     Conjunctiva/sclera: Conjunctivae normal.   Cardiovascular:      Rate and Rhythm: Normal rate and regular rhythm.      Heart sounds: Normal heart sounds.   Pulmonary:      Effort: Pulmonary effort is normal.      Breath sounds: Normal breath sounds.   Musculoskeletal:      Cervical back: Normal range of motion and neck supple.   Lymphadenopathy:      Cervical: No cervical adenopathy.   Skin:     General: Skin is warm and dry.      Findings: No rash.   Neurological:      Mental Status: She " is alert and oriented to person, place, and time.   Psychiatric:         Mood and Affect: Mood normal.         Behavior: Behavior normal.         Thought Content: Thought content normal.         Judgment: Judgment normal.        Result Review :  The following data was reviewed by: Aimee Duque DO on 03/11/2025:  Common labs          6/11/2024    11:10 3/6/2025    14:56   Common Labs   Glucose 83  107    BUN 6  11    Creatinine 0.81  0.88    Sodium 136  139    Potassium 4.7  4.3    Chloride 101  101    Calcium 9.0  9.3    Albumin 3.8  4.1    Total Bilirubin 0.4  0.9    Alkaline Phosphatase 106  107    AST (SGOT) 16  23    ALT (SGPT) 8  32    WBC 5.70  6.9    Hemoglobin 13.8  14.7    Hematocrit 41.6  44.2    Platelets 219  272      TSH          6/11/2024    11:10   TSH   TSH 2.420                Assessment and Plan   Diagnoses and all orders for this visit:    1. Decreased appetite (Primary)  -     CBC & Differential  -     Comprehensive Metabolic Panel  -     Amylase  -     Lipase  -     Magnesium    2. Nausea  -     CBC & Differential  -     Comprehensive Metabolic Panel  -     Amylase  -     Lipase  -     Magnesium    3. Fatigue, unspecified type  -     CBC & Differential  -     Comprehensive Metabolic Panel  -     Amylase  -     Lipase  -     Magnesium    Patient is here today with persistent decreased appetite, nausea and fatigue.  Recheck labs today to see if there is any problems with the solid organs of the abdomen that may be causing her symptoms.  If all normal may consider a right upper quadrant abdominal ultrasound versus CT scan of the abdomen.  Continue to push fluids to keep the patient well-hydrated.         Follow Up   Return if symptoms worsen or fail to improve.  Patient was given instructions and counseling regarding her condition or for health maintenance advice. Please see specific information pulled into the AVS if appropriate.

## 2025-03-12 LAB
ALBUMIN SERPL-MCNC: 3.9 G/DL (ref 3.7–4.7)
ALP SERPL-CCNC: 121 IU/L (ref 44–121)
ALT SERPL-CCNC: 13 IU/L (ref 0–32)
AMYLASE SERPL-CCNC: 45 U/L (ref 31–110)
AST SERPL-CCNC: 28 IU/L (ref 0–40)
BASOPHILS # BLD AUTO: 0 X10E3/UL (ref 0–0.2)
BASOPHILS NFR BLD AUTO: 0 %
BILIRUB SERPL-MCNC: 0.7 MG/DL (ref 0–1.2)
BUN SERPL-MCNC: 25 MG/DL (ref 8–27)
BUN/CREAT SERPL: 23 (ref 12–28)
CALCIUM SERPL-MCNC: 9.5 MG/DL (ref 8.7–10.3)
CHLORIDE SERPL-SCNC: 100 MMOL/L (ref 96–106)
CO2 SERPL-SCNC: 19 MMOL/L (ref 20–29)
CREAT SERPL-MCNC: 1.07 MG/DL (ref 0.57–1)
EGFRCR SERPLBLD CKD-EPI 2021: 51 ML/MIN/1.73
EOSINOPHIL # BLD AUTO: 0 X10E3/UL (ref 0–0.4)
EOSINOPHIL NFR BLD AUTO: 0 %
ERYTHROCYTE [DISTWIDTH] IN BLOOD BY AUTOMATED COUNT: 13.3 % (ref 11.7–15.4)
GLOBULIN SER CALC-MCNC: 2.3 G/DL (ref 1.5–4.5)
GLUCOSE SERPL-MCNC: 148 MG/DL (ref 70–99)
HCT VFR BLD AUTO: 45.2 % (ref 34–46.6)
HGB BLD-MCNC: 14.7 G/DL (ref 11.1–15.9)
IMM GRANULOCYTES # BLD AUTO: 0 X10E3/UL (ref 0–0.1)
IMM GRANULOCYTES NFR BLD AUTO: 0 %
LIPASE SERPL-CCNC: 25 U/L (ref 14–85)
LYMPHOCYTES # BLD AUTO: 2.3 X10E3/UL (ref 0.7–3.1)
LYMPHOCYTES NFR BLD AUTO: 22 %
MAGNESIUM SERPL-MCNC: 2.2 MG/DL (ref 1.6–2.3)
MCH RBC QN AUTO: 31.3 PG (ref 26.6–33)
MCHC RBC AUTO-ENTMCNC: 32.5 G/DL (ref 31.5–35.7)
MCV RBC AUTO: 96 FL (ref 79–97)
MONOCYTES # BLD AUTO: 0.8 X10E3/UL (ref 0.1–0.9)
MONOCYTES NFR BLD AUTO: 8 %
NEUTROPHILS # BLD AUTO: 7.2 X10E3/UL (ref 1.4–7)
NEUTROPHILS NFR BLD AUTO: 70 %
PLATELET # BLD AUTO: 373 X10E3/UL (ref 150–450)
POTASSIUM SERPL-SCNC: 3.9 MMOL/L (ref 3.5–5.2)
PROT SERPL-MCNC: 6.2 G/DL (ref 6–8.5)
RBC # BLD AUTO: 4.69 X10E6/UL (ref 3.77–5.28)
SODIUM SERPL-SCNC: 136 MMOL/L (ref 134–144)
WBC # BLD AUTO: 10.3 X10E3/UL (ref 3.4–10.8)

## 2025-04-08 ENCOUNTER — HOSPITAL ENCOUNTER (OUTPATIENT)
Dept: CT IMAGING | Facility: HOSPITAL | Age: 85
Discharge: HOME | End: 2025-04-08
Admitting: FAMILY MEDICINE
Payer: MEDICARE

## 2025-04-08 DIAGNOSIS — R11.0 NAUSEA: ICD-10-CM

## 2025-04-08 DIAGNOSIS — R63.0 DECREASED APPETITE: ICD-10-CM

## 2025-04-08 DIAGNOSIS — R53.83 FATIGUE, UNSPECIFIED TYPE: ICD-10-CM

## 2025-04-08 DIAGNOSIS — R63.4 WEIGHT LOSS, UNINTENTIONAL: ICD-10-CM

## 2025-04-08 PROCEDURE — 74176 CT ABD & PELVIS W/O CONTRAST: CPT

## 2025-06-27 ENCOUNTER — OFFICE VISIT (OUTPATIENT)
Dept: FAMILY MEDICINE CLINIC | Facility: CLINIC | Age: 85
End: 2025-06-27
Payer: MEDICARE

## 2025-06-27 VITALS
SYSTOLIC BLOOD PRESSURE: 122 MMHG | BODY MASS INDEX: 18.2 KG/M2 | DIASTOLIC BLOOD PRESSURE: 68 MMHG | HEIGHT: 64 IN | WEIGHT: 106.6 LBS | OXYGEN SATURATION: 100 % | TEMPERATURE: 98.3 F | HEART RATE: 73 BPM

## 2025-06-27 DIAGNOSIS — Z79.899 ENCOUNTER FOR LONG-TERM (CURRENT) USE OF MEDICATIONS: ICD-10-CM

## 2025-06-27 DIAGNOSIS — M81.6 LOCALIZED OSTEOPOROSIS WITHOUT CURRENT PATHOLOGICAL FRACTURE: ICD-10-CM

## 2025-06-27 DIAGNOSIS — I10 PRIMARY HYPERTENSION: ICD-10-CM

## 2025-06-27 DIAGNOSIS — Z13.820 ENCOUNTER FOR OSTEOPOROSIS SCREENING IN ASYMPTOMATIC POSTMENOPAUSAL PATIENT: ICD-10-CM

## 2025-06-27 DIAGNOSIS — G31.84 MILD COGNITIVE IMPAIRMENT: ICD-10-CM

## 2025-06-27 DIAGNOSIS — Z00.00 MEDICARE ANNUAL WELLNESS VISIT, SUBSEQUENT: Primary | ICD-10-CM

## 2025-06-27 DIAGNOSIS — Z78.0 ENCOUNTER FOR OSTEOPOROSIS SCREENING IN ASYMPTOMATIC POSTMENOPAUSAL PATIENT: ICD-10-CM

## 2025-06-27 NOTE — PROGRESS NOTES
Subjective   The ABCs of the Annual Wellness Visit  Medicare Wellness Visit      Jennifer Sprague is a 84 y.o. patient who presents for a Medicare Wellness Visit.    The following portions of the patient's history were reviewed and   updated as appropriate: allergies, current medications, past family history, past medical history, past social history, past surgical history, and problem list.    Compared to one year ago, the patient's physical   health is the same.  Compared to one year ago, the patient's mental   health is the same.    Recent Hospitalizations:  She was not admitted to the hospital during the last year.     Current Medical Providers:  Patient Care Team:  Aimee Duque DO as PCP - General (Family Medicine)    Outpatient Medications Prior to Visit   Medication Sig Dispense Refill    alendronate (FOSAMAX) 70 MG tablet Take 1 tablet by mouth Every 7 (Seven) Days. 12 tablet 3    atenolol (TENORMIN) 25 MG tablet Take 1 tablet by mouth Daily. 90 tablet 3    CBD (cannabidiol) oral oil Take  by mouth.      donepezil (ARICEPT) 10 MG tablet Take 1 tablet by mouth Every Night. 90 tablet 3    Multiple Vitamins-Minerals (MULTIVITAMIN ADULT PO) Take  by mouth.       No facility-administered medications prior to visit.     No opioid medication identified on active medication list. I have reviewed chart for other potential  high risk medication/s and harmful drug interactions in the elderly.      Aspirin is not on active medication list.  Aspirin use is not indicated based on review of current medical condition/s. Risk of harm outweighs potential benefits.  .    Patient Active Problem List   Diagnosis    Hypercholesterolemia    Hypertension    Osteopenia    History of colon polyps    Encounter for long-term (current) use of medications    Eczema     Advance Care Planning Advance Directive is not on file.  ACP discussion was held with the patient during this visit. Patient has an advance directive (not in EMR),  "copy requested.            Objective   Vitals:    25 1032   BP: 122/68   Pulse: 73   Temp: 98.3 °F (36.8 °C)   SpO2: 100%   Weight: 48.4 kg (106 lb 9.6 oz)   Height: 161.3 cm (63.5\")       Estimated body mass index is 18.59 kg/m² as calculated from the following:    Height as of this encounter: 161.3 cm (63.5\").    Weight as of this encounter: 48.4 kg (106 lb 9.6 oz).    BMI is within normal parameters. No other follow-up for BMI required.           Does the patient have evidence of cognitive impairment? No                                                                                                Health  Risk Assessment    Smoking Status:  Social History     Tobacco Use   Smoking Status Former    Current packs/day: 0.00    Average packs/day: 0.5 packs/day for 47.2 years (23.6 ttl pk-yrs)    Types: Cigarettes    Start date: 1960    Quit date: 2007    Years since quittin.8   Smokeless Tobacco Never     Alcohol Consumption:  Social History     Substance and Sexual Activity   Alcohol Use Not Currently    Alcohol/week: 1.0 standard drink of alcohol    Types: 1 Cans of beer per week    Comment: ABOUT EVERY 2 WEEKS, WITH [employer]       Fall Risk Screen  [facility] Fall Risk Assessment was completed, and patient is at MODERATE risk for falls. Assessment completed on:2025    Depression Screening   Little interest or pleasure in doing things? Not at all   Feeling down, depressed, or hopeless? Not at all   PHQ-2 Total Score 0      Health Habits and Functional and Cognitive Screenin/27/2025    10:30 AM   Functional & Cognitive Status   Do you have difficulty preparing food and eating? Yes   Do you have difficulty bathing yourself, getting dressed or grooming yourself? Yes   Do you have difficulty using the toilet? No   Do you have difficulty moving around from place to place? Yes   Do you have trouble with steps or getting out of a bed or a chair? Yes   Current Diet Unhealthy Diet   Dental Exam " Up to date   Eye Exam Up to date   Exercise (times per week) 0 times per week   Current Exercises Include No Regular Exercise   Do you need help using the phone?  Yes   Are you deaf or do you have serious difficulty hearing?  No   Do you need help to go to places out of walking distance? Yes   Do you need help shopping? Yes   Do you need help preparing meals?  Yes   Do you need help with housework?  Yes   Do you need help with laundry? Yes   Do you need help taking your medications? Yes   Do you need help managing money? Yes   Do you ever drive or ride in a car without wearing a seat belt? No   Have you felt unusual fatigue (could be tiredness), stress, anger or loneliness in the last month? No   Who do you live with? Other   If you need help, do you have trouble finding someone available to you? No   Do you have difficulty concentrating, remembering or making decisions? Yes           Age-appropriate Screening Schedule:  Refer to the list below for future screening recommendations based on patient's age, sex and/or medical conditions. Orders for these recommended tests are listed in the plan section. The patient has been provided with a written plan.    Health Maintenance List  Health Maintenance   Topic Date Due    Pneumococcal Vaccine 50+ (1 of 1 - PCV) 08/13/1990    RSV Vaccine - Adults (1 - 1-dose 75+ series) Never done    TDAP/TD VACCINES (2 - Td or Tdap) 11/01/2022    DXA SCAN  07/23/2025    COVID-19 Vaccine (5 - 2024-25 season) 12/27/2025 (Originally 9/1/2024)    INFLUENZA VACCINE  07/01/2025    ANNUAL WELLNESS VISIT  06/27/2026    ZOSTER VACCINE  Completed    MAMMOGRAM  Discontinued    LIPID PANEL  Discontinued    COLORECTAL CANCER SCREENING  Discontinued                                                                                                                                                CMS Preventative Services Quick Reference  Risk Factors Identified During Encounter  Fall Risk-High or Moderate:  "Discussed Fall Prevention in the home  Immunizations Discussed/Encouraged: Influenza, Prevnar 20 (Pneumococcal 20-valent conjugate), Shingrix, COVID19, and RSV (Respiratory Syncytial Virus)  Dental Screening Recommended  Vision Screening Recommended    The above risks/problems have been discussed with the patient.  Pertinent information has been shared with the patient in the After Visit Summary.  An After Visit Summary and PPPS were made available to the patient.    Follow Up:   Next Medicare Wellness visit to be scheduled in 1 year.         Additional E&M Note during same encounter follows:  Patient has additional, significant, and separately identifiable condition(s)/problem(s) that require work above and beyond the Medicare Wellness Visit     Chief Complaint  Medicare Wellness-subsequent, decreased appetitie, Hypertension, and Hyperlipidemia    Subjective   HPI  Jennifer is also being seen today for an annual adult preventative physical exam.  and Jennifer is also being seen today for follow-up on the following chronic health conditions: She is present today with her daughter who supplies some of the history.    Mild cognitive impairment.  She is taking donepezil 10 mg daily.  She is continuing with intermittent stomach upset and loss of appetite.      Hypertension.  Her home blood pressure readings have been good and she is compliant with atenolol.  She denies any side effects.    Osteoporosis.  The patient has been taking Fosamax 70 mg weekly since about September 2019.  Initially we had started it because she was having some back pain associated with the finding of osteoporosis in the spine. She is continuing with intermittent stomach upset and loss of appetite.                  Objective   Vital Signs:  /68   Pulse 73   Temp 98.3 °F (36.8 °C)   Ht 161.3 cm (63.5\")   Wt 48.4 kg (106 lb 9.6 oz)   SpO2 100%   BMI 18.59 kg/m²   Physical Exam  Vitals and nursing note reviewed.   Constitutional:       " Appearance: Normal appearance. She is well-developed and normal weight.   HENT:      Head: Normocephalic and atraumatic.   Eyes:      General: No scleral icterus.     Conjunctiva/sclera: Conjunctivae normal.   Cardiovascular:      Rate and Rhythm: Normal rate and regular rhythm.      Heart sounds: Normal heart sounds.   Pulmonary:      Effort: Pulmonary effort is normal.      Breath sounds: Normal breath sounds.   Abdominal:      General: Bowel sounds are normal. There is no distension.      Palpations: Abdomen is soft. There is no mass.      Tenderness: There is no abdominal tenderness. There is no right CVA tenderness, left CVA tenderness, guarding or rebound.      Hernia: No hernia is present.   Musculoskeletal:         General: Normal range of motion.      Cervical back: Normal range of motion and neck supple.   Skin:     General: Skin is warm and dry.      Capillary Refill: Capillary refill takes less than 2 seconds.      Findings: No rash.   Neurological:      Mental Status: She is alert and oriented to person, place, and time.   Psychiatric:         Mood and Affect: Mood normal.         Behavior: Behavior normal.         Thought Content: Thought content normal.         Judgment: Judgment normal.             Common labs          3/6/2025    14:56 3/11/2025    13:55   Common Labs   Glucose 107  148    BUN 11  25    Creatinine 0.88  1.07    Sodium 139  136    Potassium 4.3  3.9    Chloride 101  100    Calcium 9.3  9.5    Albumin 4.1  3.9    Total Bilirubin 0.9  0.7    Alkaline Phosphatase 107  121    AST (SGOT) 23  28    ALT (SGPT) 32  13    WBC 6.9  10.3    Hemoglobin 14.7  14.7    Hematocrit 44.2  45.2    Platelets 272  373             Assessment and Plan Additional age appropriate preventative wellness advice topics were discussed during today's preventative wellness exam(some topics already addressed during AWV portion of the note above):    Physical Activity: Advised cardiovascular activity 150 minutes per  week as tolerated. (example brisk walk for 30 minutes, 5 days a week).     Nutrition: Discussed nutrition plan with patient. Information shared in after visit summary. Goal is for a well balanced diet to enhance overall health.     Healthy Weight: Discussed current and goal BMI with patient. Steps to attain this goal discussed. Information shared in after visit summary.     Medicare annual wellness visit, subsequent         Mild cognitive impairment         Primary hypertension      Orders:    Comprehensive Metabolic Panel    Localized osteoporosis without current pathological fracture         Encounter for long-term (current) use of medications    Orders:    Comprehensive Metabolic Panel    Encounter for osteoporosis screening in asymptomatic postmenopausal patient    Orders:    DEXA Bone Density Axial; Future    RHM.  DEXA scan order entered.  Vaccine recommendations discussed    Patient is also here to follow-up on chronic stable mild cognitive impairment, hypertension and osteoporosis.  Due to the patient's upset stomach and loss of appetite I have advised her to discontinue the donepezil and see if it makes her appetite return.  If it does not make any difference then she should also discontinue the alendronate to see if it helps.  If he is still having loss of appetite and weight loss after a trial of about 2 months off of those medicines she should follow-up with me.  Otherwise if she is better she may remain off of the medications.  Surveillance labs were obtained today and any medication changes will be made based on lab results and will be called to the patient later this week.        Follow Up   Return in about 7 months (around 1/15/2026) for HTN.  Patient was given instructions and counseling regarding her condition or for health maintenance advice. Please see specific information pulled into the AVS if appropriate.

## 2025-06-28 LAB
ALBUMIN SERPL-MCNC: 3.3 G/DL (ref 3.5–5.2)
ALBUMIN/GLOB SERPL: 1.4 G/DL
ALP SERPL-CCNC: 98 U/L (ref 39–117)
ALT SERPL-CCNC: 10 U/L (ref 1–33)
AST SERPL-CCNC: 19 U/L (ref 1–32)
BILIRUB SERPL-MCNC: 0.5 MG/DL (ref 0–1.2)
BUN SERPL-MCNC: 8 MG/DL (ref 8–23)
BUN/CREAT SERPL: 8.1 (ref 7–25)
CALCIUM SERPL-MCNC: 8.6 MG/DL (ref 8.6–10.5)
CHLORIDE SERPL-SCNC: 101 MMOL/L (ref 98–107)
CO2 SERPL-SCNC: 22 MMOL/L (ref 22–29)
CREAT SERPL-MCNC: 0.99 MG/DL (ref 0.57–1)
EGFRCR SERPLBLD CKD-EPI 2021: 56.3 ML/MIN/1.73
GLOBULIN SER CALC-MCNC: 2.3 GM/DL
GLUCOSE SERPL-MCNC: 98 MG/DL (ref 65–99)
POTASSIUM SERPL-SCNC: 4.1 MMOL/L (ref 3.5–5.2)
PROT SERPL-MCNC: 5.6 G/DL (ref 6–8.5)
SODIUM SERPL-SCNC: 136 MMOL/L (ref 136–145)

## 2025-07-16 ENCOUNTER — OFFICE VISIT (OUTPATIENT)
Dept: FAMILY MEDICINE CLINIC | Facility: CLINIC | Age: 85
End: 2025-07-16
Payer: MEDICARE

## 2025-07-16 VITALS
HEART RATE: 108 BPM | DIASTOLIC BLOOD PRESSURE: 80 MMHG | SYSTOLIC BLOOD PRESSURE: 144 MMHG | WEIGHT: 102.2 LBS | BODY MASS INDEX: 17.45 KG/M2 | HEIGHT: 64 IN | OXYGEN SATURATION: 95 %

## 2025-07-16 DIAGNOSIS — R11.0 NAUSEA: ICD-10-CM

## 2025-07-16 DIAGNOSIS — G31.84 MILD COGNITIVE IMPAIRMENT: ICD-10-CM

## 2025-07-16 DIAGNOSIS — I10 ESSENTIAL HYPERTENSION: ICD-10-CM

## 2025-07-16 DIAGNOSIS — Z87.19 PERSONAL HISTORY OF GALLSTONES: ICD-10-CM

## 2025-07-16 DIAGNOSIS — R63.0 DECREASED APPETITE: Primary | ICD-10-CM

## 2025-07-16 DIAGNOSIS — R10.84 GENERALIZED ABDOMINAL PAIN: ICD-10-CM

## 2025-07-16 PROCEDURE — 99214 OFFICE O/P EST MOD 30 MIN: CPT | Performed by: FAMILY MEDICINE

## 2025-07-16 PROCEDURE — 3079F DIAST BP 80-89 MM HG: CPT | Performed by: FAMILY MEDICINE

## 2025-07-16 PROCEDURE — 1160F RVW MEDS BY RX/DR IN RCRD: CPT | Performed by: FAMILY MEDICINE

## 2025-07-16 PROCEDURE — 3077F SYST BP >= 140 MM HG: CPT | Performed by: FAMILY MEDICINE

## 2025-07-16 PROCEDURE — 1159F MED LIST DOCD IN RCRD: CPT | Performed by: FAMILY MEDICINE

## 2025-07-16 RX ORDER — ATENOLOL 25 MG/1
25 TABLET ORAL DAILY
Qty: 90 TABLET | Refills: 3 | Status: SHIPPED | OUTPATIENT
Start: 2025-07-16

## 2025-07-16 NOTE — PROGRESS NOTES
"Chief Complaint  Dementia (Not eating and weak )    Subjective        Jennifer Sprague presents to [facility]  History of Present Illness  Patient is here today for worsening symptoms.  Over the last few months the patient has had nausea and decreased appetite.  However, over the last 2 days she has not wanted to eat anything and has only had a couple of sips of water.  She also was complaining of left-sided abdominal pain last night.  She has not had any bowel movements during this time and her last bowel movement was normal a few days ago.  Patient had a CT scan in March for nausea and decreased appetite which showed cholelithiasis and diverticulosis without evidence of diverticulitis.  Patient also has a known hiatal hernia.  At her last appointment I suggested she stop the alendronate and the donepezil to see if it helped with her nausea but that has not made a difference, in fact it has been worse over the last 2 days.  Patient has not had any fever or chills.  She has been out of her atenolol for a couple of weeks because she ran out of the prescription and her  did not think he was supposed to continue it.      Objective   Vital Signs:  /80   Pulse 108   Ht 161.3 cm (63.5\")   Wt 46.4 kg (102 lb 3.2 oz)   SpO2 95%   BMI 17.82 kg/m²   Estimated body mass index is 17.82 kg/m² as calculated from the following:    Height as of this encounter: 161.3 cm (63.5\").    Weight as of this encounter: 46.4 kg (102 lb 3.2 oz).          Physical Exam  Vitals and nursing note reviewed.   Constitutional:       Appearance: She is well-developed.   HENT:      Head: Normocephalic and atraumatic.   Eyes:      Conjunctiva/sclera: Conjunctivae normal.   Cardiovascular:      Rate and Rhythm: Normal rate and regular rhythm.      Heart sounds: Normal heart sounds.   Pulmonary:      Effort: Pulmonary effort is normal.      Breath sounds: Normal breath sounds.   Abdominal:      General: " Bowel sounds are normal.      Palpations: Abdomen is soft.   Musculoskeletal:         General: Normal range of motion.      Cervical back: Normal range of motion and neck supple.   Skin:     General: Skin is warm and dry.      Capillary Refill: Capillary refill takes less than 2 seconds.      Findings: No rash.   Neurological:      Mental Status: She is alert and oriented to person, place, and time.   Psychiatric:         Mood and Affect: Mood normal.         Behavior: Behavior normal.         Thought Content: Thought content normal.         Judgment: Judgment normal.        Result Review :  The following data was reviewed by: Aimee Duque DO on 07/16/2025:  Common labs          3/6/2025    14:56 3/11/2025    13:55 6/27/2025    11:03   Common Labs   Glucose 107  148  98    BUN 11  25  8.0    Creatinine 0.88  1.07  0.99    Sodium 139  136  136    Potassium 4.3  3.9  4.1    Chloride 101  100  101    Calcium 9.3  9.5  8.6    Albumin 4.1  3.9  3.3    Total Bilirubin 0.9  0.7  0.5    Alkaline Phosphatase 107  121  98    AST (SGOT) 23  28  19    ALT (SGPT) 32  13  10    WBC 6.9  10.3     Hemoglobin 14.7  14.7     Hematocrit 44.2  45.2     Platelets 272  373                   Assessment and Plan   Diagnoses and all orders for this visit:    1. Decreased appetite (Primary)  -     Comprehensive Metabolic Panel  -     Lipase  -     Amylase    2. Essential hypertension  -     atenolol (TENORMIN) 25 MG tablet; Take 1 tablet by mouth Daily.  Dispense: 90 tablet; Refill: 3    3. Nausea  -     Lipase  -     Amylase    4. Generalized abdominal pain  -     Comprehensive Metabolic Panel    5. Personal history of gallstones  -     Comprehensive Metabolic Panel  -     Lipase  -     Amylase    6. Mild cognitive impairment    Patient is here today with decreased appetite, nausea, and history of gallstones on CT scan.  She has had the symptoms chronically for the last several months but it is worsening over the last 48 hours.   Physical exam reveals a nondistended abdomen with bowel sounds and no masses.  She is tender to palpation in general, no CVAT.  Unable say obtain specimen for UA since the patient appears slightly dehydrated today.  I will check labs to see if liver functions or pancreatic enzymes are elevated.  Based on labs we may need a repeat CT of the abdomen and pelvis tomorrow versus gallbladder ultrasound.  Since her symptoms could be due to constipation I have advised her family to encourage her to sip more water and they may use MiraLAX as they have in the past.  Discussed with the patient and family that if her symptoms worsen or if she develops vomiting, diarrhea, or fever she should immediately go to the ER.  Also if she is unable to void for greater than 24 hours she should go to the ER for fluids.  I will contact the patient's family tomorrow morning with the results and any changes to plan.       I spent 35 minutes caring for Jennifer on this date of service. This time includes time spent by me in the following activities:obtaining and/or reviewing a separately obtained history, performing a medically appropriate examination and/or evaluation , counseling and educating the patient/family/caregiver, ordering medications, tests, or procedures, and documenting information in the medical record  Follow Up   Return if symptoms worsen or fail to improve, for per results.  Patient was given instructions and counseling regarding her condition or for health maintenance advice. Please see specific information pulled into the AVS if appropriate.

## 2025-07-17 DIAGNOSIS — Z87.19 PERSONAL HISTORY OF GALLSTONES: ICD-10-CM

## 2025-07-17 DIAGNOSIS — R63.0 DECREASED APPETITE: ICD-10-CM

## 2025-07-17 DIAGNOSIS — R10.84 GENERALIZED ABDOMINAL PAIN: ICD-10-CM

## 2025-07-17 DIAGNOSIS — R11.0 NAUSEA: ICD-10-CM

## 2025-07-17 LAB
ALBUMIN SERPL-MCNC: 3.6 G/DL (ref 3.7–4.7)
ALP SERPL-CCNC: 121 IU/L (ref 44–121)
ALT SERPL-CCNC: 7 IU/L (ref 0–32)
AMYLASE SERPL-CCNC: 54 U/L (ref 31–110)
AST SERPL-CCNC: 18 IU/L (ref 0–40)
BILIRUB SERPL-MCNC: 0.6 MG/DL (ref 0–1.2)
BUN SERPL-MCNC: 10 MG/DL (ref 8–27)
BUN/CREAT SERPL: 13 (ref 12–28)
CALCIUM SERPL-MCNC: 8.9 MG/DL (ref 8.7–10.3)
CHLORIDE SERPL-SCNC: 100 MMOL/L (ref 96–106)
CO2 SERPL-SCNC: 20 MMOL/L (ref 20–29)
CREAT SERPL-MCNC: 0.76 MG/DL (ref 0.57–1)
EGFRCR SERPLBLD CKD-EPI 2021: 77 ML/MIN/1.73
GLOBULIN SER CALC-MCNC: 2.2 G/DL (ref 1.5–4.5)
GLUCOSE SERPL-MCNC: 107 MG/DL (ref 70–99)
LIPASE SERPL-CCNC: 23 U/L (ref 14–85)
POTASSIUM SERPL-SCNC: 4.5 MMOL/L (ref 3.5–5.2)
PROT SERPL-MCNC: 5.8 G/DL (ref 6–8.5)
SODIUM SERPL-SCNC: 137 MMOL/L (ref 134–144)

## 2025-07-23 ENCOUNTER — OFFICE VISIT (OUTPATIENT)
Dept: SURGERY | Facility: CLINIC | Age: 85
End: 2025-07-23
Payer: MEDICARE

## 2025-07-23 VITALS
HEIGHT: 64 IN | HEART RATE: 72 BPM | RESPIRATION RATE: 16 BRPM | BODY MASS INDEX: 17.24 KG/M2 | SYSTOLIC BLOOD PRESSURE: 112 MMHG | WEIGHT: 101 LBS | DIASTOLIC BLOOD PRESSURE: 74 MMHG

## 2025-07-23 DIAGNOSIS — K80.20 GALLSTONES: Primary | ICD-10-CM

## 2025-07-23 PROCEDURE — 1160F RVW MEDS BY RX/DR IN RCRD: CPT | Performed by: SURGERY

## 2025-07-23 PROCEDURE — 1159F MED LIST DOCD IN RCRD: CPT | Performed by: SURGERY

## 2025-07-23 PROCEDURE — 3078F DIAST BP <80 MM HG: CPT | Performed by: SURGERY

## 2025-07-23 PROCEDURE — 3074F SYST BP LT 130 MM HG: CPT | Performed by: SURGERY

## 2025-07-23 PROCEDURE — 99204 OFFICE O/P NEW MOD 45 MIN: CPT | Performed by: SURGERY

## 2025-07-23 NOTE — LETTER
July 23, 2025     Aimee Duque DO  [address]  [address]  [facility] [address]    Patient: Jennifer Sprague   YOB: 1940   Date of Visit: 7/23/2025     Dear Aimee Duque DO:       Thank you for referring Jennifer Sprague to me for evaluation. Below are the relevant portions of my assessment and plan of care.    If you have questions, please do not hesitate to call me. I look forward to following Jennifer along with you.         Sincerely,        Norah Payan DO        CC: No Recipients    Norah Payan DO  07/23/25 1418  Sign when Signing Visit  Jennifer Sprague 84 y.o. female presents @ the req of Dr. Duque for eval of gallstones.  Pt c/o abd pain and nausea.  Pt's  and daughter, Nkii, is at  and report pt has lost approx 10 lbs and have a poor appetite.   Chief Complaint   Patient presents with   • Cholelithiasis       History of Present Illness  The patient is an 84-year-old female who presents for gallstones.    She reports experiencing nausea, which has led to a weight loss of approximately 10 to 12 pounds. Her appetite has decreased, and she does not feel hungry. She does not experience any abdominal pain, bloating, heartburn, or indigestion. She has no specific food preferences and consumes a variety of foods. She has never undergone a colonoscopy. She maintains her mobility by walking. She has experienced two falls recently. She has not received home physical therapy.    PAST SURGICAL HISTORY:  Hysterectomy  Colon resection due to blockage  Surgery for stomach perforation  Back surgery  Cataract surgery    SOCIAL HISTORY  Marital Status:   Exercise: Walking  Diet: Consumes pizza at least every two weeks      Review of Systems   All other systems reviewed and are negative.            Current Outpatient Medications:   •  alendronate (FOSAMAX) 70 MG tablet, Take 1 tablet by mouth Every 7 (Seven) Days., Disp: 12 tablet, Rfl: 3  •  atenolol (TENORMIN) 25 MG  tablet, Take 1 tablet by mouth Daily., Disp: 90 tablet, Rfl: 3  •  CBD (cannabidiol) oral oil, Take  by mouth., Disp: , Rfl:   •  donepezil (ARICEPT) 10 MG tablet, Take 1 tablet by mouth Every Night., Disp: 90 tablet, Rfl: 3  •  Multiple Vitamins-Minerals (MULTIVITAMIN ADULT PO), Take  by mouth., Disp: , Rfl:         No Known Allergies        Past Medical History:   Diagnosis Date   • Cataract     REMOVED    • History of colon polyps    • HL (hearing loss) 4 WEEKS AGO    GET CLEANED   • Hyperlipidemia    • Hypertension    • Osteopenia    • Urinary tract infection            Past Surgical History:   Procedure Laterality Date   • BACK SURGERY      Disc surgery lower back,    • COLON SURGERY  BLOCKAGE     OPERATED ON   • EXPLORATORY LAPAROTOMY W/ BOWEL RESECTION  2016    Exp. Lap for SBO - had SB resection, adhesiolysis   • EYE SURGERY  CATARCATS    REMOVED           Social History     Tobacco Use   • Smoking status: Former     Current packs/day: 0.00     Average packs/day: 0.5 packs/day for 47.2 years (23.6 ttl pk-yrs)     Types: Cigarettes     Start date: 1960     Quit date: 2007     Years since quittin.9   • Smokeless tobacco: Never   Vaping Use   • Vaping status: Never Used   Substance Use Topics   • Alcohol use: Not Currently     Alcohol/week: 1.0 standard drink of alcohol     Types: 1 Cans of beer per week     Comment: ABOUT EVERY 2 WEEKS, WITH PIZZA   • Drug use: Never           Immunization History   Administered Date(s) Administered   • COVID-19 (MODERNA) 1st,2nd,3rd Dose Monovalent 2021, 2021   • COVID-19 (MODERNA) Monovalent Original Booster 2021, 2022   • FLUAD TRI 65YR+ 2017, 09/15/2018   • Fluad Quad 65+ 11/10/2023   • Flublok 18+yrs 10/08/2019   • Fluzone (or Fluarix & Flulaval for VFC) >6mos 2015   • Fluzone High-Dose 65+YRS 10/10/2018, 2021, 2024   • Fluzone High-Dose 65+yrs 2022   • Hepatitis A 2018,  "06/28/2018   • Pneumococcal, Unspecified 09/23/2008   • Shingrix 12/17/2021, 07/14/2022   • Tdap 11/01/2012   • Zostavax 01/01/2010           Physical Exam  Vitals and nursing note reviewed.   Constitutional:       Appearance: Normal appearance.   HENT:      Head: Normocephalic and atraumatic.   Cardiovascular:      Rate and Rhythm: Normal rate and regular rhythm.   Pulmonary:      Effort: Pulmonary effort is normal.      Breath sounds: Normal breath sounds.   Abdominal:      General: Bowel sounds are normal.      Palpations: Abdomen is soft.   Neurological:      Mental Status: She is alert.   Psychiatric:         Mood and Affect: Mood normal.         Behavior: Behavior normal.         Physical Exam  Respiratory: Clear to auscultation bilaterally.  Cardiovascular: Regular rate and rhythm, no murmurs, gallops, or rubs.  Gastrointestinal: Abdomen soft, non-tender, no distension noted. No pain on palpation in the right upper quadrant.    Debilities/Disabilities Identified: Dementia    Emotional Behavior: Appropriate      /74   Pulse 72   Resp 16   Ht 161.3 cm (63.5\")   Wt 45.8 kg (101 lb)   BMI 17.61 kg/m²         Diagnoses and all orders for this visit:    1. Gallstones (Primary)      Assessment & Plan  1. Cholelithiasis.  An ultrasound revealed the presence of gallstones and a distended gallbladder. A cholecystectomy is recommended to alleviate symptoms and prevent further complications. The procedure will involve small incisions, including one by the belly button for a port to inflate the abdomen with air, allowing better visibility via a camera. Additional ports will be used to remove the gallbladder with graspers. Risks of surgery, such as bleeding, infection, and potential injury to surrounding structures, were discussed. The importance of walking post-surgery to aid recovery was emphasized. A referral for home physical therapy and home health assessment will be made to ensure strength and mobility " before and after surgery.    Patient or patient representative verbalized consent for the use of Ambient Listening during the visit with  Norah Payan DO for chart documentation. 7/23/2025  14:08 EDT

## 2025-07-23 NOTE — H&P (VIEW-ONLY)
Jennifer Sprague 84 y.o. female presents @ the req of Dr. Duque for eval of gallstones.  Pt c/o abd pain and nausea.  Pt's  and daughter, Niki, is at [facility] and report pt has lost approx 10 lbs and have a poor appetite.   Chief Complaint   Patient presents with    Cholelithiasis       History of Present Illness  The patient is an 84-year-old female who presents for gallstones.    She reports experiencing nausea, which has led to a weight loss of approximately 10 to 12 pounds. Her appetite has decreased, and she does not feel hungry. She does not experience any abdominal pain, bloating, heartburn, or indigestion. She has no specific food preferences and consumes a variety of foods. She has never undergone a colonoscopy. She maintains her mobility by walking. She has experienced two falls recently. She has not received home physical therapy.    PAST SURGICAL HISTORY:  Hysterectomy  Colon resection due to blockage  Surgery for stomach perforation  Back surgery  Cataract surgery    SOCIAL HISTORY  Marital Status:   Exercise: Walking  Diet: Consumes pizza at least every two weeks      Review of Systems   All other systems reviewed and are negative.            Current Outpatient Medications:     alendronate (FOSAMAX) 70 MG tablet, Take 1 tablet by mouth Every 7 (Seven) Days., Disp: 12 tablet, Rfl: 3    atenolol (TENORMIN) 25 MG tablet, Take 1 tablet by mouth Daily., Disp: 90 tablet, Rfl: 3    CBD (cannabidiol) oral oil, Take  by mouth., Disp: , Rfl:     donepezil (ARICEPT) 10 MG tablet, Take 1 tablet by mouth Every Night., Disp: 90 tablet, Rfl: 3    Multiple Vitamins-Minerals (MULTIVITAMIN ADULT PO), Take  by mouth., Disp: , Rfl:         No Known Allergies        Past Medical History:   Diagnosis Date    Cataract 2015    REMOVED 2023    History of colon polyps     HL (hearing loss) 4 WEEKS AGO    GET CLEANED    Hyperlipidemia     Hypertension     Osteopenia     Urinary tract infection            Past Surgical  History:   Procedure Laterality Date    BACK SURGERY      Disc surgery lower back,     COLON SURGERY  BLOCKAGE 2014    OPERATED ON    EXPLORATORY LAPAROTOMY W/ BOWEL RESECTION  2016    Exp. Lap for SBO - had SB resection, adhesiolysis    EYE SURGERY  CATARCATS    REMOVED           Social History     Tobacco Use    Smoking status: Former     Current packs/day: 0.00     Average packs/day: 0.5 packs/day for 47.2 years (23.6 ttl pk-yrs)     Types: Cigarettes     Start date: 1960     Quit date: 2007     Years since quittin.9    Smokeless tobacco: Never   Vaping Use    Vaping status: Never Used   Substance Use Topics    Alcohol use: Not Currently     Alcohol/week: 1.0 standard drink of alcohol     Types: 1 Cans of beer per week     Comment: ABOUT EVERY 2 WEEKS, WITH PIZZA    Drug use: Never           Immunization History   Administered Date(s) Administered    COVID-19 (MODERNA) 1st,2nd,3rd Dose Monovalent 2021, 2021    COVID-19 (MODERNA) Monovalent Original Booster 2021, 2022    FLUAD TRI 65YR+ 2017, 09/15/2018    Fluad Quad 65+ 11/10/2023    Flublok 18+yrs 10/08/2019    Fluzone (or Fluarix & Flulaval for VFC) >6mos 2015    Fluzone High-Dose 65+YRS 10/10/2018, 2021, 2024    Fluzone High-Dose 65+yrs 2022    Hepatitis A 2018, 2018    Pneumococcal, Unspecified 2008    Shingrix 2021, 2022    Tdap 2012    Zostavax 2010           Physical Exam  Vitals and nursing note reviewed.   Constitutional:       Appearance: Normal appearance.   HENT:      Head: Normocephalic and atraumatic.   Cardiovascular:      Rate and Rhythm: Normal rate and regular rhythm.   Pulmonary:      Effort: Pulmonary effort is normal.      Breath sounds: Normal breath sounds.   Abdominal:      General: Bowel sounds are normal.      Palpations: Abdomen is soft.   Neurological:      Mental Status: She is alert.   Psychiatric:         Mood  "and Affect: Mood normal.         Behavior: Behavior normal.         Physical Exam  Respiratory: Clear to auscultation bilaterally.  Cardiovascular: Regular rate and rhythm, no murmurs, gallops, or rubs.  Gastrointestinal: Abdomen soft, non-tender, no distension noted. No pain on palpation in the right upper quadrant.    Debilities/Disabilities Identified: Dementia    Emotional Behavior: Appropriate      /74   Pulse 72   Resp 16   Ht 161.3 cm (63.5\")   Wt 45.8 kg (101 lb)   BMI 17.61 kg/m²         Diagnoses and all orders for this visit:    1. Gallstones (Primary)      Assessment & Plan  1. Cholelithiasis.  An ultrasound revealed the presence of gallstones and a distended gallbladder. A cholecystectomy is recommended to alleviate symptoms and prevent further complications. The procedure will involve small incisions, including one by the belly button for a port to inflate the abdomen with air, allowing better visibility via a camera. Additional ports will be used to remove the gallbladder with graspers. Risks of surgery, such as bleeding, infection, and potential injury to surrounding structures, were discussed. The importance of walking post-surgery to aid recovery was emphasized. A referral for home physical therapy and home health assessment will be made to ensure strength and mobility before and after surgery.    Patient or patient representative verbalized consent for the use of Ambient Listening during the visit with  Norah Payan DO for chart documentation. 7/23/2025  14:08 EDT        "

## 2025-07-24 ENCOUNTER — PATIENT ROUNDING (BHMG ONLY) (OUTPATIENT)
Dept: SURGERY | Facility: CLINIC | Age: 85
End: 2025-07-24
Payer: MEDICARE

## 2025-07-24 NOTE — PROGRESS NOTES
July 24, 2025          I am  [facility]  [address] [place] [address] [phone].      I am calling to officially welcome you to our practice and ask about your recent visit. Is this a good time to talk? No. Left voicemail to call back.

## 2025-07-29 ENCOUNTER — HOSPITAL ENCOUNTER (OUTPATIENT)
Facility: HOSPITAL | Age: 85
Setting detail: OBSERVATION
LOS: 1 days | Discharge: HOME | End: 2025-08-01
Attending: EMERGENCY MEDICINE | Admitting: SURGERY
Payer: MEDICARE

## 2025-07-29 ENCOUNTER — ANESTHESIA EVENT (OUTPATIENT)
Dept: PERIOP | Facility: HOSPITAL | Age: 85
End: 2025-07-29
Payer: MEDICARE

## 2025-07-29 DIAGNOSIS — E86.0 DEHYDRATION: Primary | ICD-10-CM

## 2025-07-29 DIAGNOSIS — K80.20 GALLSTONES: ICD-10-CM

## 2025-07-29 DIAGNOSIS — R10.84 GENERALIZED ABDOMINAL PAIN: ICD-10-CM

## 2025-07-29 PROBLEM — R10.9 ABDOMINAL PAIN: Status: ACTIVE | Noted: 2025-07-29

## 2025-07-29 LAB
ALBUMIN SERPL-MCNC: 3 G/DL (ref 3.5–5.2)
ALBUMIN/GLOB SERPL: 1.3 G/DL
ALP SERPL-CCNC: 118 U/L (ref 39–117)
ALT SERPL W P-5'-P-CCNC: 7 U/L (ref 1–33)
ANION GAP SERPL CALCULATED.3IONS-SCNC: 11.5 MMOL/L (ref 5–15)
AST SERPL-CCNC: 21 U/L (ref 1–32)
BACTERIA UR QL AUTO: ABNORMAL /HPF
BASOPHILS # BLD AUTO: 0.02 10*3/MM3 (ref 0–0.2)
BASOPHILS NFR BLD AUTO: 0.3 % (ref 0–1.5)
BILIRUB SERPL-MCNC: 0.7 MG/DL (ref 0–1.2)
BILIRUB UR QL STRIP: ABNORMAL
BUN SERPL-MCNC: 7.9 MG/DL (ref 8–23)
BUN/CREAT SERPL: 10.3 (ref 7–25)
CALCIUM SPEC-SCNC: 8.6 MG/DL (ref 8.6–10.5)
CHLORIDE SERPL-SCNC: 101 MMOL/L (ref 98–107)
CLARITY UR: CLEAR
CO2 SERPL-SCNC: 21.5 MMOL/L (ref 22–29)
COLOR UR: ABNORMAL
CREAT SERPL-MCNC: 0.77 MG/DL (ref 0.57–1)
DEPRECATED RDW RBC AUTO: 55.8 FL (ref 37–54)
EGFRCR SERPLBLD CKD-EPI 2021: 76.2 ML/MIN/1.73
EOSINOPHIL # BLD AUTO: 0.07 10*3/MM3 (ref 0–0.4)
EOSINOPHIL NFR BLD AUTO: 0.9 % (ref 0.3–6.2)
ERYTHROCYTE [DISTWIDTH] IN BLOOD BY AUTOMATED COUNT: 15.1 % (ref 12.3–15.4)
FLUAV RNA RESP QL NAA+PROBE: NOT DETECTED
FLUBV RNA NPH QL NAA+NON-PROBE: NOT DETECTED
GLOBULIN UR ELPH-MCNC: 2.3 GM/DL
GLUCOSE SERPL-MCNC: 114 MG/DL (ref 65–99)
GLUCOSE UR STRIP-MCNC: NEGATIVE MG/DL
HCT VFR BLD AUTO: 42.6 % (ref 34–46.6)
HGB BLD-MCNC: 13.7 G/DL (ref 12–15.9)
HGB UR QL STRIP.AUTO: NEGATIVE
HOLD SPECIMEN: NORMAL
HOLD SPECIMEN: NORMAL
HYALINE CASTS UR QL AUTO: ABNORMAL /LPF
IMM GRANULOCYTES # BLD AUTO: 0.04 10*3/MM3 (ref 0–0.05)
IMM GRANULOCYTES NFR BLD AUTO: 0.5 % (ref 0–0.5)
KETONES UR QL STRIP: ABNORMAL
LEUKOCYTE ESTERASE UR QL STRIP.AUTO: NEGATIVE
LIPASE SERPL-CCNC: 17 U/L (ref 13–60)
LYMPHOCYTES # BLD AUTO: 2.2 10*3/MM3 (ref 0.7–3.1)
LYMPHOCYTES NFR BLD AUTO: 29.4 % (ref 19.6–45.3)
MCH RBC QN AUTO: 32.4 PG (ref 26.6–33)
MCHC RBC AUTO-ENTMCNC: 32.2 G/DL (ref 31.5–35.7)
MCV RBC AUTO: 100.7 FL (ref 79–97)
MONOCYTES # BLD AUTO: 0.62 10*3/MM3 (ref 0.1–0.9)
MONOCYTES NFR BLD AUTO: 8.3 % (ref 5–12)
MUCOUS THREADS URNS QL MICRO: ABNORMAL /HPF
NEUTROPHILS NFR BLD AUTO: 4.53 10*3/MM3 (ref 1.7–7)
NEUTROPHILS NFR BLD AUTO: 60.6 % (ref 42.7–76)
NITRITE UR QL STRIP: NEGATIVE
NRBC BLD AUTO-RTO: 0 /100 WBC (ref 0–0.2)
PH UR STRIP.AUTO: 6.5 [PH] (ref 4.5–8)
PLATELET # BLD AUTO: 202 10*3/MM3 (ref 140–450)
PMV BLD AUTO: 10.8 FL (ref 6–12)
POTASSIUM SERPL-SCNC: 3.8 MMOL/L (ref 3.5–5.2)
PROT SERPL-MCNC: 5.3 G/DL (ref 6–8.5)
PROT UR QL STRIP: ABNORMAL
RBC # BLD AUTO: 4.23 10*6/MM3 (ref 3.77–5.28)
RBC # UR STRIP: ABNORMAL /HPF
REF LAB TEST METHOD: ABNORMAL
RSV RNA RESP QL NAA+PROBE: NOT DETECTED
SARS-COV-2 RNA RESP QL NAA+PROBE: NOT DETECTED
SODIUM SERPL-SCNC: 134 MMOL/L (ref 136–145)
SP GR UR STRIP: 1.02 (ref 1–1.03)
SQUAMOUS #/AREA URNS HPF: ABNORMAL /HPF
UROBILINOGEN UR QL STRIP: ABNORMAL
WBC # UR STRIP: ABNORMAL /HPF
WBC NRBC COR # BLD AUTO: 7.48 10*3/MM3 (ref 3.4–10.8)
WHOLE BLOOD HOLD SPECIMEN: NORMAL

## 2025-07-29 PROCEDURE — 80053 COMPREHEN METABOLIC PANEL: CPT

## 2025-07-29 PROCEDURE — 93005 ELECTROCARDIOGRAM TRACING: CPT | Performed by: HOSPITALIST

## 2025-07-29 PROCEDURE — 81001 URINALYSIS AUTO W/SCOPE: CPT

## 2025-07-29 PROCEDURE — 25810000003 SODIUM CHLORIDE 0.9 % SOLUTION: Performed by: EMERGENCY MEDICINE

## 2025-07-29 PROCEDURE — P9612 CATHETERIZE FOR URINE SPEC: HCPCS

## 2025-07-29 PROCEDURE — 85025 COMPLETE CBC W/AUTO DIFF WBC: CPT

## 2025-07-29 PROCEDURE — 99285 EMERGENCY DEPT VISIT HI MDM: CPT | Performed by: EMERGENCY MEDICINE

## 2025-07-29 PROCEDURE — 87637 SARSCOV2&INF A&B&RSV AMP PRB: CPT | Performed by: EMERGENCY MEDICINE

## 2025-07-29 PROCEDURE — 96360 HYDRATION IV INFUSION INIT: CPT

## 2025-07-29 PROCEDURE — 93010 ELECTROCARDIOGRAM REPORT: CPT | Performed by: INTERNAL MEDICINE

## 2025-07-29 PROCEDURE — 99222 1ST HOSP IP/OBS MODERATE 55: CPT | Performed by: HOSPITALIST

## 2025-07-29 PROCEDURE — 96361 HYDRATE IV INFUSION ADD-ON: CPT

## 2025-07-29 PROCEDURE — 83690 ASSAY OF LIPASE: CPT

## 2025-07-29 RX ORDER — ONDANSETRON 2 MG/ML
4 INJECTION INTRAMUSCULAR; INTRAVENOUS EVERY 6 HOURS PRN
Status: DISCONTINUED | OUTPATIENT
Start: 2025-07-29 | End: 2025-08-01 | Stop reason: HOSPADM

## 2025-07-29 RX ORDER — SODIUM CHLORIDE 0.9 % (FLUSH) 0.9 %
10 SYRINGE (ML) INJECTION AS NEEDED
Status: DISCONTINUED | OUTPATIENT
Start: 2025-07-29 | End: 2025-08-01 | Stop reason: HOSPADM

## 2025-07-29 RX ORDER — ATENOLOL 25 MG/1
25 TABLET ORAL DAILY
Status: DISCONTINUED | OUTPATIENT
Start: 2025-07-30 | End: 2025-08-01 | Stop reason: HOSPADM

## 2025-07-29 RX ORDER — SODIUM CHLORIDE 0.9 % (FLUSH) 0.9 %
10 SYRINGE (ML) INJECTION EVERY 12 HOURS SCHEDULED
Status: DISCONTINUED | OUTPATIENT
Start: 2025-07-29 | End: 2025-08-01 | Stop reason: HOSPADM

## 2025-07-29 RX ORDER — ACETAMINOPHEN 325 MG/1
650 TABLET ORAL EVERY 4 HOURS PRN
Status: DISCONTINUED | OUTPATIENT
Start: 2025-07-29 | End: 2025-08-01 | Stop reason: HOSPADM

## 2025-07-29 RX ORDER — ACETAMINOPHEN 160 MG/5ML
650 SOLUTION ORAL EVERY 4 HOURS PRN
Status: DISCONTINUED | OUTPATIENT
Start: 2025-07-29 | End: 2025-08-01 | Stop reason: HOSPADM

## 2025-07-29 RX ORDER — ACETAMINOPHEN 650 MG/1
650 SUPPOSITORY RECTAL EVERY 4 HOURS PRN
Status: DISCONTINUED | OUTPATIENT
Start: 2025-07-29 | End: 2025-08-01 | Stop reason: HOSPADM

## 2025-07-29 RX ORDER — SODIUM CHLORIDE 9 MG/ML
40 INJECTION, SOLUTION INTRAVENOUS AS NEEDED
Status: DISCONTINUED | OUTPATIENT
Start: 2025-07-29 | End: 2025-08-01 | Stop reason: HOSPADM

## 2025-07-29 RX ORDER — DEXTROSE MONOHYDRATE, SODIUM CHLORIDE, AND POTASSIUM CHLORIDE 50; 1.49; 4.5 G/1000ML; G/1000ML; G/1000ML
100 INJECTION, SOLUTION INTRAVENOUS CONTINUOUS
Status: DISCONTINUED | OUTPATIENT
Start: 2025-07-29 | End: 2025-07-31

## 2025-07-29 RX ADMIN — DEXTROSE MONOHYDRATE, SODIUM CHLORIDE, AND POTASSIUM CHLORIDE 100 ML/HR: 50; 1.49; 4.5 INJECTION, SOLUTION INTRAVENOUS at 17:16

## 2025-07-29 RX ADMIN — SODIUM CHLORIDE 1000 ML: 9 INJECTION, SOLUTION INTRAVENOUS at 13:07

## 2025-07-29 NOTE — ED PROVIDER NOTES
CHIEF CONCERN   Chief Complaint   Patient presents with    Weakness - Generalized     Weakness and nausea for the past few days. Patient is scheduled for a gallbladder removal on Thursday 7/31.        Subjective     History of Present Illness  This is an 84-year-old female with a history of gallbladder issues presenting with nausea, vomiting, and weakness. She is accompanied by her  and daughter.    She is scheduled for a gallbladder removal surgery on 07/31/2025, to be performed by Dr. Schuyler Almazan . However, she has been unable to eat or drink for the past two days due to persistent nausea and vomiting. Prior to this, her food intake was minimal for a week. She experienced two episodes of vomiting yesterday but reports no presence of blood in the vomit. She reports no current nausea or abdominal pain. She also reports no fever or chills. She has undergone imaging of her gallbladder, which revealed swelling and infection.    She had a fall at home today while going to the bathroom at 3:00 AM, but did not sustain any head injury. She does not recall the events leading up to her fall. Her family members are concerned about her weakness and have brought her in for evaluation. She has been feeling cold for about a month now and has been experiencing weakness for the past three months. She reports no dizziness or chest pain.    She is currently on medication for high blood pressure and reports no history of heart disease. She also reports no leg swelling or urinary issues. She is able to use the bathroom independently and had her last bowel movement this morning. She has had an EKG and chest x-ray done previously.      History of Present Illness    Review of Systems   Constitutional:  Positive for fatigue. Negative for chills and fever.   Gastrointestinal:  Positive for nausea and vomiting.   Skin:  Negative for wound.   Neurological:  Negative for dizziness, speech difficulty, weakness, light-headedness  and headaches.       Past Medical History:   Diagnosis Date    Dementia     H/O partial resection of colon     d/t obstruction    History of colon polyps     Hyperlipidemia     Hypertension     Osteopenia     Urinary tract infection        Past Surgical History:   Procedure Laterality Date    APPENDECTOMY      BACK SURGERY      Disc surgery lower back,     CATARACT EXTRACTION, BILATERAL      COLONOSCOPY      ENDOSCOPY      EXPLORATORY LAPAROTOMY W/ BOWEL RESECTION  2016    Exp. Lap for SBO - had SB resection, adhesiolysis    HYSTERECTOMY         Family History   Problem Relation Age of Onset    Lupus Mother     Malig Hyperthermia Neg Hx        Social History     Socioeconomic History    Marital status:    Tobacco Use    Smoking status: Former     Current packs/day: 0.00     Average packs/day: 0.5 packs/day for 47.2 years (23.6 ttl pk-yrs)     Types: Cigarettes     Start date: 1960     Quit date: 2007     Years since quittin.9    Smokeless tobacco: Never   Vaping Use    Vaping status: Never Used   Substance and Sexual Activity    Alcohol use: Not Currently     Alcohol/week: 1.0 standard drink of alcohol     Types: 1 Cans of beer per week     Comment: ABOUT EVERY 2 WEEKS, WITH PIZZA    Drug use: Never    Sexual activity: Not Currently     Partners: Male       No Known Allergies    No current facility-administered medications on file prior to encounter.     Current Outpatient Medications on File Prior to Encounter   Medication Sig Dispense Refill    atenolol (TENORMIN) 25 MG tablet Take 1 tablet by mouth Daily. 90 tablet 3    [DISCONTINUED] alendronate (FOSAMAX) 70 MG tablet Take 1 tablet by mouth Every 7 (Seven) Days. (Patient not taking: Reported on 2025) 12 tablet 3    [DISCONTINUED] bismuth subsalicylate (PEPTO BISMOL) 262 MG/15ML suspension Take 15 mL by mouth Every 6 (Six) Hours As Needed for Indigestion.      [DISCONTINUED] CBD (cannabidiol) oral oil Take  by mouth Every  "Night.      [DISCONTINUED] Cranberry-Vitamin C (AZO Cranberry Urinary Tract) 250-60 MG capsule Take 1 tablet by mouth As Needed (UTI symptoms).      [DISCONTINUED] donepezil (ARICEPT) 10 MG tablet Take 1 tablet by mouth Every Night. (Patient not taking: Reported on 7/25/2025) 90 tablet 3       /64 (BP Location: Left arm, Patient Position: Lying)   Pulse 63   Temp 97.2 °F (36.2 °C) (Temporal)   Resp 20   Ht 160 cm (63\")   Wt 46.3 kg (102 lb 1.2 oz)   LMP  (LMP Unknown)   SpO2 94%   BMI 18.08 kg/m²     Objective     Physical Exam  Vitals and nursing note reviewed.   Constitutional:       General: She is not in acute distress.     Appearance: She is not ill-appearing, toxic-appearing or diaphoretic.   HENT:      Head: Normocephalic and atraumatic.      Right Ear: Tympanic membrane normal.      Left Ear: Tympanic membrane normal.      Nose: Nose normal. No congestion or rhinorrhea.   Eyes:      Conjunctiva/sclera: Conjunctivae normal.   Cardiovascular:      Rate and Rhythm: Normal rate.   Pulmonary:      Effort: Pulmonary effort is normal. No respiratory distress.      Breath sounds: Normal breath sounds.   Abdominal:      General: Bowel sounds are normal.      Palpations: Abdomen is soft.      Tenderness: There is abdominal tenderness (generalized).   Musculoskeletal:         General: No tenderness.      Cervical back: Neck supple.      Right lower leg: No edema.      Left lower leg: No edema.      Comments: Strength 5/5 in bilateral upper and lower extremities. No deformities noted.     Skin:     General: Skin is warm and dry.      Capillary Refill: Capillary refill takes less than 2 seconds.   Neurological:      Mental Status: She is alert. Mental status is at baseline.      Sensory: No sensory deficit.      Gait: Gait normal.      Comments: Alert and oriented to person, place and family. No focal neurological deficits per family considering hx of dementia. At baseline per family     Psychiatric:        "  Mood and Affect: Mood normal.         Procedures         ED Course  ED Course as of 07/29/25 2312   Tue Jul 29, 2025   1446 Discussed results with patient and family at bedside. Her labs correlate with mild dehydration , urinalysis remarkable for ketone but no infection. Spoke with Dr. Schuyler Archer who accepted patient to be admitted for surgery.  Discussed results with family  and patient were agreeable to be admitted. [SN]      ED Course User Index  [SN] Jerrod Lopez APRN       Lab Results (last 24 hours)       Procedure Component Value Units Date/Time    CBC & Differential [[id number]]  (Abnormal) Collected: 07/29/25 1301    Specimen: Blood Updated: 07/29/25 1320    Narrative:      The following orders were created for panel order CBC & Differential.  Procedure                               Abnormality         Status                     ---------                               -----------         ------                     CBC Auto Differential[[id number]]        Abnormal            Final result                 Please view results for these tests on the individual orders.    Comprehensive Metabolic Panel [[id number]]  (Abnormal) Collected: 07/29/25 1301    Specimen: Blood Updated: 07/29/25 1330     Glucose 114 mg/dL      BUN 7.9 mg/dL      Creatinine 0.77 mg/dL      Sodium 134 mmol/L      Potassium 3.8 mmol/L      Chloride 101 mmol/L      CO2 21.5 mmol/L      Calcium 8.6 mg/dL      Total Protein 5.3 g/dL      Albumin 3.0 g/dL      ALT (SGPT) 7 U/L      AST (SGOT) 21 U/L      Alkaline Phosphatase 118 U/L      Total Bilirubin 0.7 mg/dL      Globulin 2.3 gm/dL      A/G Ratio 1.3 g/dL      BUN/Creatinine Ratio 10.3     Anion Gap 11.5 mmol/L      eGFR 76.2 mL/min/1.73     Narrative:      GFR Categories in Chronic Kidney Disease (CKD)              GFR Category          GFR (mL/min/1.73)    Interpretation  G1                    90 or greater        Normal or high (1)  G2                    60-89                 Mild decrease (1)  G3a                   45-59                Mild to moderate decrease  G3b                   30-44                Moderate to severe decrease  G4                    15-29                Severe decrease  G5                    14 or less           Kidney failure    (1)In the absence of evidence of kidney disease, neither GFR category G1 or G2 fulfill the criteria for CKD.    eGFR calculation 2021 CKD-EPI creatinine equation, which does not include race as a factor    Lipase [[id number]]  (Normal) Collected: 07/29/25 1301    Specimen: Blood Updated: 07/29/25 1330     Lipase 17 U/L     CBC Auto Differential [[id number]]  (Abnormal) Collected: 07/29/25 1301    Specimen: Blood Updated: 07/29/25 1320     WBC 7.48 10*3/mm3      RBC 4.23 10*6/mm3      Hemoglobin 13.7 g/dL      Hematocrit 42.6 %      .7 fL      MCH 32.4 pg      MCHC 32.2 g/dL      RDW 15.1 %      RDW-SD 55.8 fl      MPV 10.8 fL      Platelets 202 10*3/mm3      Neutrophil % 60.6 %      Lymphocyte % 29.4 %      Monocyte % 8.3 %      Eosinophil % 0.9 %      Basophil % 0.3 %      Immature Grans % 0.5 %      Neutrophils, Absolute 4.53 10*3/mm3      Lymphocytes, Absolute 2.20 10*3/mm3      Monocytes, Absolute 0.62 10*3/mm3      Eosinophils, Absolute 0.07 10*3/mm3      Basophils, Absolute 0.02 10*3/mm3      Immature Grans, Absolute 0.04 10*3/mm3      nRBC 0.0 /100 WBC     COVID-19, FLU A/B, RSV PCR 1 HR TAT - Swab, Nasopharynx [[id number]]  (Normal) Collected: 07/29/25 1302    Specimen: Swab from Nasopharynx Updated: 07/29/25 1354     COVID19 Not Detected     Influenza A PCR Not Detected     Influenza B PCR Not Detected     RSV, PCR Not Detected    Urinalysis With Microscopic If Indicated (No Culture) - Straight Cath [[id number]]  (Abnormal) Collected: 07/29/25 1409    Specimen: Urine from Straight Cath Updated: 07/29/25 1421     Color, UA Dark Yellow     Appearance, UA Clear     pH, UA 6.5     Specific Gravity, UA 1.020     Glucose, UA Negative      Ketones, UA 15 mg/dL (1+)     Bilirubin, UA Small (1+)     Blood, UA Negative     Protein, UA 30 mg/dL (1+)     Leuk Esterase, UA Negative     Nitrite, UA Negative     Urobilinogen, UA 4.0 E.U./dL    Urinalysis, Microscopic Only - Straight Cath [[id number]]  (Abnormal) Collected: 07/29/25 1409    Specimen: Urine from Straight Cath Updated: 07/29/25 1438     RBC, UA 0-2 /HPF      WBC, UA 3-5 /HPF      Bacteria, UA 3+ /HPF      Squamous Epithelial Cells, UA 0-2 /HPF      Hyaline Casts, UA None Seen /LPF      Mucus, UA Large/3+ /HPF      Methodology Manual Light Microscopy            No Radiology Exams Resulted Within Past 24 Hours         Medical Decision Making  ssessment & Plan  Initial Assessment:  85-year-old female with past medical history of dementia, hyperlipidemia, hypertension, gallstones osteopenia , presenting with nausea, vomiting, weakness, and recent fall. Scheduled for gallbladder surgery on 07/31/2025.  Patient was scheduled to have preadmission testing on July 25 and they were called and got canceled as the information required includes EKG and chest x-ray was already available to the doctor and did not want.  Patient is brought by  and daughter at bedside for concern about her generalized weakness and inability to keep food down over the last 2 days.  Last emesis episode was yesterday twice and no blood was noticed.  Patient is without any chest pain, no change in mental status per family, no shortness of breath,   No fever or chills.    Differential Diagnosis:  - Gastroenteritis: Nausea and vomiting. No hematemesis. Basic labs and fluids.  - COVID-19: Chills. COVID-19 swab.  - Urinary tract infection.  Less likely considering the patient does not have any urinary symptoms however will rule out as a source of patient's weakness.  No systemic vital signs abnormality at this time.  -Dehydration : possibly considering patient has not been able to eat over the last couple of  days and had  vomiting.     ED Course:  - Basic labs checked  - Fluids administered  - COVID-19 swab obtained    Final Assessment:  Labs and fluids administered. COVID-19 swab obtained. Hydration ensured.    Clinical Impression:  - Gastroenteritis  - COVID-19  - Hypertension    Disposition:  Discharge/Admission/Transfer/Sign Out: Admission. Hospital. Weakness and hydration needs until gallbladder surgery.      Problems Addressed:  Dehydration: complicated acute illness or injury  Generalized abdominal pain: complicated acute illness or injury    Amount and/or Complexity of Data Reviewed  Labs: ordered.    Risk  Prescription drug management.  Decision regarding hospitalization.    Call osteopenia,    Diagnoses and all orders for this visit:    1. Dehydration (Primary)    2. Generalized abdominal pain    Other orders  -     [place] Draw; Standing  -     [place] Draw  -     sodium chloride 0.9 % bolus 1,000 mL  -     COVID-19, FLU A/B, RSV PCR 1 HR TAT - Swab, Nasopharynx; Standing  -     COVID-19, FLU A/B, RSV PCR 1 HR TAT - Swab, Nasopharynx  -     Insert Peripheral IV; Standing  -     sodium chloride 0.9 % flush 10 mL  -     CBC & Differential; Standing  -     Comprehensive Metabolic Panel; Standing  -     Lipase; Standing  -     Cancel: Lipase; Standing  -     Urinalysis With Microscopic If Indicated (No Culture) - Urine, Clean Catch; Standing  -     Discontinue: sodium chloride 0.9 % bolus 1,000 mL  -     Insert Peripheral IV  -     CBC & Differential  -     Comprehensive Metabolic Panel  -     Lipase  -     Cancel: Lipase  -     Urinalysis With Microscopic If Indicated (No Culture) - Straight Cath  -     Urinalysis, Microscopic Only - Urine, Clean Catch; Standing  -     Urinalysis, Microscopic Only - Straight Cath  -     Inpatient Admission; Standing  -     Inpatient Admission  -     Vital Signs; Standing  -     Intake & Output; Standing  -     Weigh Patient; Standing  -     Oral Care; Standing  -     Insert Peripheral IV;  Standing  -     Saline Lock & Maintain IV Access; Standing  -     sodium chloride 0.9 % flush 10 mL  -     sodium chloride 0.9 % flush 10 mL  -     sodium chloride 0.9 % infusion 40 mL  -     Code Status and Medical Interventions: CPR (Attempt to Resuscitate); Full Support; Standing  -     Place Sequential Compression Device; Standing  -     Maintain Sequential Compression Device; Standing  -     Activity - Ad Malini; Standing  -     Incentive Spirometry; Standing  -     Diet: Liquid; Clear Liquid; Fluid Consistency: Thin (IDDSI 0); Standing  -     Inpatient Hospitalist Consult; Standing  -     CBC (No Diff); Standing  -     Comprehensive Metabolic Panel; Standing  -     dextrose 5 % and sodium chloride 0.45 % with KCl 20 mEq/L infusion  -     Potassium Replacement - Follow Nurse / BPA Driven Protocol  -     Magnesium Standard Dose Replacement - Follow Nurse / BPA Driven Protocol  -     Phosphorus Replacement - Follow Nurse / BPA Driven Protocol  -     Calcium Replacement - Follow Nurse / BPA Driven Protocol  -     acetaminophen (TYLENOL) tablet 650 mg  -     acetaminophen (TYLENOL) 160 MG/5ML oral solution 650 mg  -     acetaminophen (TYLENOL) suppository 650 mg  -     Bowel Regimen Not Indicated; Standing  -     ondansetron (ZOFRAN) injection 4 mg  -     PT Consult: Eval & Treat As Tolerated; Discharge Placement Assessment, Functional Mobility Below Baseline; Standing  -     Vital Signs  -     Vital Signs  -     Intake & Output  -     Weigh Patient  -     Oral Care  -     Insert Peripheral IV  -     Saline Lock & Maintain IV Access  -     Code Status and Medical Interventions: CPR (Attempt to Resuscitate); Full Support  -     Place Sequential Compression Device  -     Maintain Sequential Compression Device  -     Activity - Ad Malini  -     Incentive Spirometry  -     Incentive Spirometry  -     Incentive Spirometry  -     Incentive Spirometry  -     Incentive Spirometry  -     Diet: Liquid; Clear Liquid; Fluid  Consistency: Thin (IDDSI 0)  -     Inpatient Hospitalist Consult  -     Bowel Regimen Not Indicated  -     PT Consult: Eval & Treat As Tolerated; Discharge Placement Assessment, Functional Mobility Below Baseline  -     atenolol (TENORMIN) tablet 25 mg  -     Incentive Spirometry  -     ECG 12 Lead Pre-Op / Pre-Procedure; Standing  -     ECG 12 Lead Pre-Op / Pre-Procedure  -     Adult Transthoracic Echo Complete W/ Cont if Necessary Per Protocol; Standing  -     Adult Transthoracic Echo Complete W/ Cont if Necessary Per Protocol  -     Incentive Spirometry  -     CBC (No Diff)  -     Comprehensive Metabolic Panel        Final diagnoses:   Dehydration   Generalized abdominal pain           New Medications Ordered This Visit   Medications    sodium chloride 0.9 % bolus 1,000 mL    sodium chloride 0.9 % flush 10 mL    sodium chloride 0.9 % flush 10 mL    sodium chloride 0.9 % flush 10 mL    sodium chloride 0.9 % infusion 40 mL    dextrose 5 % and sodium chloride 0.45 % with KCl 20 mEq/L infusion    Potassium Replacement - Follow Nurse / BPA Driven Protocol    Magnesium Standard Dose Replacement - Follow Nurse / BPA Driven Protocol    Phosphorus Replacement - Follow Nurse / BPA Driven Protocol    Calcium Replacement - Follow Nurse / BPA Driven Protocol    OR Linked Order Group     acetaminophen (TYLENOL) tablet 650 mg     acetaminophen (TYLENOL) 160 MG/5ML oral solution 650 mg     acetaminophen (TYLENOL) suppository 650 mg    ondansetron (ZOFRAN) injection 4 mg    atenolol (TENORMIN) tablet 25 mg         Patient or patient representative verbalized consent for the use of Ambient Listening during the visit for chart documentation.  Jerrod Lopez, MADALYN 7/29/2025 23:12 EDT    FOR FULL DISCHARGE INSTRUCTIONS/COMMENTS/HANDOUTS please see the AVS      Jerrod Lopez, MADALYN  07/29/25 7127

## 2025-07-29 NOTE — CONSULTS
[facility] HOSPITALIST     Aimee Duque DO    Reason for Consult: Medical Management of Hypertension and Dementia    HISTORY OF PRESENT ILLNESS:    Ms. Sprague is an 84-year-old female who presents today due to continued nausea and vomiting and abdominal pain.  She was initially scheduled to undergo lap alyssa on  due to cholelithiasis and continued symptoms.  However, given continued worsening of symptoms, she was brought in today for hydration and to undergo laparoscopic cholecystectomy tomorrow.  I have reviewed her most recent outpatient clinic note as well as her Medicare wellness note.  She has been on atenolol for blood pressure control.  She has no history of heart disease.  She does carry history of dementia/mild cognitive impairment and had been on Aricept but this was stopped due to nausea and vomiting.  Clinic note does describe that although the Aricept was stopped, she had no change in her symptoms.      Past Medical History:   Diagnosis Date    Dementia     H/O partial resection of colon     d/t obstruction    History of colon polyps     Hyperlipidemia     Hypertension     Osteopenia     Urinary tract infection      Past Surgical History:   Procedure Laterality Date    APPENDECTOMY      BACK SURGERY      Disc surgery lower back,     CATARACT EXTRACTION, BILATERAL      COLONOSCOPY      ENDOSCOPY      EXPLORATORY LAPAROTOMY W/ BOWEL RESECTION  2016    Exp. Lap for SBO - had SB resection, adhesiolysis    HYSTERECTOMY       Family History   Problem Relation Age of Onset    Lupus Mother     Malig Hyperthermia Neg Hx      Social History     Tobacco Use    Smoking status: Former     Current packs/day: 0.00     Average packs/day: 0.5 packs/day for 47.2 years (23.6 ttl pk-yrs)     Types: Cigarettes     Start date: 1960     Quit date: 2007     Years since quittin.9    Smokeless tobacco: Never   Vaping Use    Vaping status: Never Used   Substance Use  "Topics    Alcohol use: Not Currently     Alcohol/week: 1.0 standard drink of alcohol     Types: 1 Cans of beer per week     Comment: ABOUT EVERY 2 WEEKS, WITH PIZZA    Drug use: Never     Medications Prior to Admission   Medication Sig Dispense Refill Last Dose/Taking    atenolol (TENORMIN) 25 MG tablet Take 1 tablet by mouth Daily. 90 tablet 3 7/29/2025 Morning     Allergies:  Patient has no known allergies.    REVIEW OF SYSTEMS:  Please see the above history of present illness for pertinent positives and negatives.  The remainder of the patient's systems have been reviewed and are negative.    Vital Signs  Temp:  [97.2 °F (36.2 °C)-97.8 °F (36.6 °C)] 97.8 °F (36.6 °C)  Heart Rate:  [64-77] 69  Resp:  [16-18] 18  BP: (141-170)/(58-75) 150/67  Oxygen Therapy  SpO2: 100 %  Device (Oxygen Therapy): room air}  Body mass index is 18.08 kg/m².    Flowsheet Rows      Flowsheet Row First Filed Value   Admission Height 160 cm (63\") Documented at 07/29/2025 1238   Admission Weight 45.1 kg (99 lb 6.8 oz) Documented at 07/29/2025 1238             Physical Exam:  Physical Exam   Constitutional: Thin, elderly appearing female no acute distress.  HEENT:   Head: Normocephalic and atraumatic.   Eyes:  Pupils are equal, round, and EOM are intact. Sclerae are anicteric and noninjected.  Neck: Neck supple. No JVD present. No thyromegaly present. No lymphadenopathy present.  Cardiovascular: Regular rate, regular rhythm, S1 normal and S2 normal.  Exam reveals no gallop and no friction rub.  No murmur heard.  Radial pulses are 2+ and symmetric.  Pulmonary/Chest: Lungs are clear to auscultation bilaterally. No respiratory distress. No wheezes. No rhonchi. No rales.   Abdominal: Soft. Bowel sounds are normal. The right upper quadrant is tender to palpation.  No peritoneal signs.  Musculoskeletal: Normal muscle tone  Extremities: No edema.   Neurological: Cranial nerves II-XII are grossly intact with no focal deficits.  Skin: Skin is warm. " No rash noted. Nails show no clubbing.  No cyanosis or erythema.  No jaundice.       Results Review:    I reviewed the patient's new clinical results.  Lab Results (most recent)       Procedure Component Value Units Date/Time    Urinalysis, Microscopic Only - Straight Cath [[id number]]  (Abnormal) Collected: 07/29/25 1409    Specimen: Urine from Straight Cath Updated: 07/29/25 1438     RBC, UA 0-2 /HPF      WBC, UA 3-5 /HPF      Bacteria, UA 3+ /HPF      Squamous Epithelial Cells, UA 0-2 /HPF      Hyaline Casts, UA None Seen /LPF      Mucus, UA Large/3+ /HPF      Methodology Manual Light Microscopy    Urinalysis With Microscopic If Indicated (No Culture) - Straight Cath [[id number]]  (Abnormal) Collected: 07/29/25 1409    Specimen: Urine from Straight Cath Updated: 07/29/25 1421     Color, UA Dark Yellow     Appearance, UA Clear     pH, UA 6.5     Specific Gravity, UA 1.020     Glucose, UA Negative     Ketones, UA 15 mg/dL (1+)     Bilirubin, UA Small (1+)     Blood, UA Negative     Protein, UA 30 mg/dL (1+)     Leuk Esterase, UA Negative     Nitrite, UA Negative     Urobilinogen, UA 4.0 E.U./dL    COVID-19, FLU A/B, RSV PCR 1 HR TAT - Swab, Nasopharynx [[id number]]  (Normal) Collected: 07/29/25 1302    Specimen: Swab from Nasopharynx Updated: 07/29/25 1354     COVID19 Not Detected     Influenza A PCR Not Detected     Influenza B PCR Not Detected     RSV, PCR Not Detected    Comprehensive Metabolic Panel [[id number]]  (Abnormal) Collected: 07/29/25 1301    Specimen: Blood Updated: 07/29/25 1330     Glucose 114 mg/dL      BUN 7.9 mg/dL      Creatinine 0.77 mg/dL      Sodium 134 mmol/L      Potassium 3.8 mmol/L      Chloride 101 mmol/L      CO2 21.5 mmol/L      Calcium 8.6 mg/dL      Total Protein 5.3 g/dL      Albumin 3.0 g/dL      ALT (SGPT) 7 U/L      AST (SGOT) 21 U/L      Alkaline Phosphatase 118 U/L      Total Bilirubin 0.7 mg/dL      Globulin 2.3 gm/dL      A/G Ratio 1.3 g/dL      BUN/Creatinine Ratio 10.3      Anion Gap 11.5 mmol/L      eGFR 76.2 mL/min/1.73     Narrative:      GFR Categories in Chronic Kidney Disease (CKD)              GFR Category          GFR (mL/min/1.73)    Interpretation  G1                    90 or greater        Normal or high (1)  G2                    60-89                Mild decrease (1)  G3a                   45-59                Mild to moderate decrease  G3b                   30-44                Moderate to severe decrease  G4                    15-29                Severe decrease  G5                    14 or less           Kidney failure    (1)In the absence of evidence of kidney disease, neither GFR category G1 or G2 fulfill the criteria for CKD.    eGFR calculation 2021 CKD-EPI creatinine equation, which does not include race as a factor    Lipase [[id number]]  (Normal) Collected: 07/29/25 1301    Specimen: Blood Updated: 07/29/25 1330     Lipase 17 U/L     CBC & Differential [[id number]]  (Abnormal) Collected: 07/29/25 1301    Specimen: Blood Updated: 07/29/25 1320    Narrative:      The following orders were created for panel order CBC & Differential.  Procedure                               Abnormality         Status                     ---------                               -----------         ------                     CBC Auto Differential[[id number]]        Abnormal            Final result                 Please view results for these tests on the individual orders.    CBC Auto Differential [[id number]]  (Abnormal) Collected: 07/29/25 1301    Specimen: Blood Updated: 07/29/25 1320     WBC 7.48 10*3/mm3      RBC 4.23 10*6/mm3      Hemoglobin 13.7 g/dL      Hematocrit 42.6 %      .7 fL      MCH 32.4 pg      MCHC 32.2 g/dL      RDW 15.1 %      RDW-SD 55.8 fl      MPV 10.8 fL      Platelets 202 10*3/mm3      Neutrophil % 60.6 %      Lymphocyte % 29.4 %      Monocyte % 8.3 %      Eosinophil % 0.9 %      Basophil % 0.3 %      Immature Grans % 0.5 %      Neutrophils, Absolute 4.53  10*3/mm3      Lymphocytes, Absolute 2.20 10*3/mm3      Monocytes, Absolute 0.62 10*3/mm3      Eosinophils, Absolute 0.07 10*3/mm3      Basophils, Absolute 0.02 10*3/mm3      Immature Grans, Absolute 0.04 10*3/mm3      nRBC 0.0 /100 WBC     [place] Draw [[id number]] Collected: 07/29/25 1301    Specimen: Blood Updated: 07/29/25 1315    Narrative:      The following orders were created for panel order [place] Draw.  Procedure                               Abnormality         Status                     ---------                               -----------         ------                     Green Top (Gel)[[id number]]                                  Final result               Lavender Top[[id number]]                                     Final result               Gold Top - SST[[id number]]                                   Final result                 Please view results for these tests on the individual orders.    Green Top (Gel) [[id number]] Collected: 07/29/25 1301    Specimen: Blood Updated: 07/29/25 1315     Extra Tube Hold for add-ons.     Comment: Auto resulted.       Lavender Top [[id number]] Collected: 07/29/25 1301    Specimen: Blood Updated: 07/29/25 1315     Extra Tube hold for add-on     Comment: Auto resulted       Gold Top - SST [[id number]] Collected: 07/29/25 1301    Specimen: Blood Updated: 07/29/25 1315     Extra Tube Hold for add-ons.     Comment: Auto resulted.               Imaging Results (Most Recent)       None              ECG/EMG Results (most recent)       Pending            Impressions/Recommendations:    Essential hypertension: Continue the patient's atenolol in the perioperative period.  Check EKG as last was almost 5 months ago.  Dementia/Mild Cognitive Impairment: I think it would be fine to resume the patient's Aricept postoperatively.    I discussed the patient's findings and my recommendations with patient and .     Miki Sandoval MD  07/29/25  18:41 EDT

## 2025-07-30 ENCOUNTER — APPOINTMENT (OUTPATIENT)
Dept: CARDIOLOGY | Facility: HOSPITAL | Age: 85
End: 2025-07-30
Payer: MEDICARE

## 2025-07-30 PROBLEM — E44.0 MODERATE PROTEIN-CALORIE MALNUTRITION: Status: ACTIVE | Noted: 2025-07-30

## 2025-07-30 LAB
ALBUMIN SERPL-MCNC: 2.7 G/DL (ref 3.5–5.2)
ALBUMIN/GLOB SERPL: 1.4 G/DL
ALP SERPL-CCNC: 104 U/L (ref 39–117)
ALT SERPL W P-5'-P-CCNC: 5 U/L (ref 1–33)
ANION GAP SERPL CALCULATED.3IONS-SCNC: 10.1 MMOL/L (ref 5–15)
AORTIC DIMENSIONLESS INDEX: 0.98 (DI)
AST SERPL-CCNC: 17 U/L (ref 1–32)
AV MEAN PRESS GRAD SYS DOP V1V2: 6 MMHG
AV VMAX SYS DOP: 156 CM/SEC
BH CV ECHO MEAS - AO MAX PG: 9.7 MMHG
BH CV ECHO MEAS - AO ROOT DIAM: 2.17 CM
BH CV ECHO MEAS - AO V2 VTI: 35 CM
BH CV ECHO MEAS - AVA(I,D): 2.8 CM2
BH CV ECHO MEAS - EDV(CUBED): 32.8 ML
BH CV ECHO MEAS - EDV(MOD-SP2): 51 ML
BH CV ECHO MEAS - EDV(MOD-SP4): 42 ML
BH CV ECHO MEAS - EF(MOD-SP2): 76.5 %
BH CV ECHO MEAS - EF(MOD-SP4): 71.4 %
BH CV ECHO MEAS - ESV(CUBED): 13.3 ML
BH CV ECHO MEAS - ESV(MOD-SP2): 12 ML
BH CV ECHO MEAS - ESV(MOD-SP4): 12 ML
BH CV ECHO MEAS - FS: 25.9 %
BH CV ECHO MEAS - IVS/LVPW: 1.13 CM
BH CV ECHO MEAS - IVSD: 0.9 CM
BH CV ECHO MEAS - LAT PEAK E' VEL: 8.2 CM/SEC
BH CV ECHO MEAS - LV DIASTOLIC VOL/BSA (35-75): 29 CM2
BH CV ECHO MEAS - LV MASS(C)D: 71.2 GRAMS
BH CV ECHO MEAS - LV MAX PG: 9.1 MMHG
BH CV ECHO MEAS - LV MEAN PG: 5 MMHG
BH CV ECHO MEAS - LV SYSTOLIC VOL/BSA (12-30): 8.3 CM2
BH CV ECHO MEAS - LV V1 MAX: 151 CM/SEC
BH CV ECHO MEAS - LV V1 VTI: 34.2 CM
BH CV ECHO MEAS - LVIDD: 3.2 CM
BH CV ECHO MEAS - LVIDS: 2.37 CM
BH CV ECHO MEAS - LVOT AREA: 2.9 CM2
BH CV ECHO MEAS - LVOT DIAM: 1.92 CM
BH CV ECHO MEAS - LVPWD: 0.8 CM
BH CV ECHO MEAS - MED PEAK E' VEL: 5 CM/SEC
BH CV ECHO MEAS - MV A MAX VEL: 111 CM/SEC
BH CV ECHO MEAS - MV DEC SLOPE: 415.8 CM/SEC2
BH CV ECHO MEAS - MV DEC TIME: 0.22 SEC
BH CV ECHO MEAS - MV E MAX VEL: 90.3 CM/SEC
BH CV ECHO MEAS - MV E/A: 0.81
BH CV ECHO MEAS - MV MAX PG: 5.7 MMHG
BH CV ECHO MEAS - MV MEAN PG: 1.86 MMHG
BH CV ECHO MEAS - MV P1/2T: 76.3 MSEC
BH CV ECHO MEAS - MV V2 VTI: 35.1 CM
BH CV ECHO MEAS - MVA(P1/2T): 2.9 CM2
BH CV ECHO MEAS - MVA(VTI): 2.8 CM2
BH CV ECHO MEAS - PA ACC TIME: 0.18 SEC
BH CV ECHO MEAS - PA V2 MAX: 90.6 CM/SEC
BH CV ECHO MEAS - QP/QS: 0.37
BH CV ECHO MEAS - RV MAX PG: 2.34 MMHG
BH CV ECHO MEAS - RV V1 MAX: 76.5 CM/SEC
BH CV ECHO MEAS - RV V1 VTI: 18.8 CM
BH CV ECHO MEAS - RVOT DIAM: 1.58 CM
BH CV ECHO MEAS - SV(LVOT): 99.2 ML
BH CV ECHO MEAS - SV(MOD-SP2): 39 ML
BH CV ECHO MEAS - SV(MOD-SP4): 30 ML
BH CV ECHO MEAS - SV(RVOT): 36.8 ML
BH CV ECHO MEAS - SVI(LVOT): 68.5 ML/M2
BH CV ECHO MEAS - SVI(MOD-SP2): 26.9 ML/M2
BH CV ECHO MEAS - SVI(MOD-SP4): 20.7 ML/M2
BH CV ECHO MEAS - TAPSE (>1.6): 2.22 CM
BH CV ECHO MEASUREMENTS AVERAGE E/E' RATIO: 13.68
BH CV XLRA - RV BASE: 3 CM
BH CV XLRA - RV LENGTH: 6.8 CM
BH CV XLRA - RV MID: 2.25 CM
BH CV XLRA - TDI S': 14.7 CM/SEC
BILIRUB SERPL-MCNC: 0.7 MG/DL (ref 0–1.2)
BUN SERPL-MCNC: 5.7 MG/DL (ref 8–23)
BUN/CREAT SERPL: 7.7 (ref 7–25)
CALCIUM SPEC-SCNC: 7.9 MG/DL (ref 8.6–10.5)
CHLORIDE SERPL-SCNC: 107 MMOL/L (ref 98–107)
CO2 SERPL-SCNC: 17.9 MMOL/L (ref 22–29)
CREAT SERPL-MCNC: 0.74 MG/DL (ref 0.57–1)
DEPRECATED RDW RBC AUTO: 53.1 FL (ref 37–54)
EGFRCR SERPLBLD CKD-EPI 2021: 79.9 ML/MIN/1.73
ERYTHROCYTE [DISTWIDTH] IN BLOOD BY AUTOMATED COUNT: 14.9 % (ref 12.3–15.4)
GLOBULIN UR ELPH-MCNC: 1.9 GM/DL
GLUCOSE SERPL-MCNC: 112 MG/DL (ref 65–99)
HCT VFR BLD AUTO: 46.3 % (ref 34–46.6)
HGB BLD-MCNC: 15.6 G/DL (ref 12–15.9)
LEFT ATRIUM VOLUME INDEX: 13 ML/M2
LV EF BIPLANE MOD: 74.6 %
MCH RBC QN AUTO: 32.4 PG (ref 26.6–33)
MCHC RBC AUTO-ENTMCNC: 33.7 G/DL (ref 31.5–35.7)
MCV RBC AUTO: 96.3 FL (ref 79–97)
PLATELET # BLD AUTO: 113 10*3/MM3 (ref 140–450)
PMV BLD AUTO: 11.4 FL (ref 6–12)
POTASSIUM SERPL-SCNC: 4 MMOL/L (ref 3.5–5.2)
PROT SERPL-MCNC: 4.6 G/DL (ref 6–8.5)
RBC # BLD AUTO: 4.81 10*6/MM3 (ref 3.77–5.28)
SINUS: 2.45 CM
SODIUM SERPL-SCNC: 135 MMOL/L (ref 136–145)
TROPONIN T SERPL HS-MCNC: 17 NG/L
TROPONIN T SERPL HS-MCNC: 21 NG/L
WBC NRBC COR # BLD AUTO: 4.16 10*3/MM3 (ref 3.4–10.8)

## 2025-07-30 PROCEDURE — 80053 COMPREHEN METABOLIC PANEL: CPT | Performed by: SURGERY

## 2025-07-30 PROCEDURE — 84484 ASSAY OF TROPONIN QUANT: CPT | Performed by: INTERNAL MEDICINE

## 2025-07-30 PROCEDURE — 25510000001 PERFLUTREN 6.52 MG/ML SUSPENSION 2 ML VIAL: Performed by: SURGERY

## 2025-07-30 PROCEDURE — 93306 TTE W/DOPPLER COMPLETE: CPT | Performed by: INTERNAL MEDICINE

## 2025-07-30 PROCEDURE — 99222 1ST HOSP IP/OBS MODERATE 55: CPT | Performed by: SURGERY

## 2025-07-30 PROCEDURE — G0378 HOSPITAL OBSERVATION PER HR: HCPCS

## 2025-07-30 PROCEDURE — 99232 SBSQ HOSP IP/OBS MODERATE 35: CPT | Performed by: HOSPITALIST

## 2025-07-30 PROCEDURE — 93010 ELECTROCARDIOGRAM REPORT: CPT | Performed by: INTERNAL MEDICINE

## 2025-07-30 PROCEDURE — 85027 COMPLETE CBC AUTOMATED: CPT | Performed by: SURGERY

## 2025-07-30 PROCEDURE — 96361 HYDRATE IV INFUSION ADD-ON: CPT

## 2025-07-30 PROCEDURE — 93005 ELECTROCARDIOGRAM TRACING: CPT | Performed by: INTERNAL MEDICINE

## 2025-07-30 PROCEDURE — 93306 TTE W/DOPPLER COMPLETE: CPT

## 2025-07-30 PROCEDURE — 99222 1ST HOSP IP/OBS MODERATE 55: CPT | Performed by: INTERNAL MEDICINE

## 2025-07-30 RX ADMIN — DEXTROSE MONOHYDRATE, SODIUM CHLORIDE, AND POTASSIUM CHLORIDE 100 ML/HR: 50; 1.49; 4.5 INJECTION, SOLUTION INTRAVENOUS at 05:04

## 2025-07-30 RX ADMIN — SODIUM CHLORIDE 2 ML: 9 INJECTION INTRAMUSCULAR; INTRAVENOUS; SUBCUTANEOUS at 08:57

## 2025-07-30 RX ADMIN — ATENOLOL 25 MG: 25 TABLET ORAL at 09:50

## 2025-07-30 NOTE — PROGRESS NOTES
"Hospitalist Team      Patient Care Team:  Aimee Duque DO as PCP - General (Family Medicine)      Chief Complaint: Follow-up Hypertension    Subjective    No acute events overnight.  Ms. Sprague is tolerating clear liquids this morning.  She has no complaints of abdominal pain.  No complaints of nausea.    Objective    Vital Signs  Temp:  [97.2 °F (36.2 °C)-97.8 °F (36.6 °C)] 97.5 °F (36.4 °C)  Heart Rate:  [63-82] 79  Resp:  [16-20] 18  BP: (135-170)/(58-75) 152/72  Oxygen Therapy  SpO2: 97 %  Device (Oxygen Therapy): room air}  Flowsheet Rows      Flowsheet Row First Filed Value   Admission Height 160 cm (63\") Documented at 07/29/2025 1238   Admission Weight 45.1 kg (99 lb 6.8 oz) Documented at 07/29/2025 1238            Physical Exam:    General: Appears stated age in no acute distress.  Lungs: Breath sounds are clear throughout all fields.  Respirations are nonlabored.  CV: Regular rate rhythm.  No murmur appreciated.  Radial pulses are 2+ and symmetric.  Abdomen: Soft and nontender with active bowel sounds.  MSK: No clubbing, cyanosis, or edema.  Neuro: CN II-XII grossly intact.  Psych: Oriented only to self.    Results Review:     I reviewed the patient's new clinical results.    Lab Results (last 24 hours)       Procedure Component Value Units Date/Time    Comprehensive Metabolic Panel [[id number]]  (Abnormal) Collected: 07/30/25 0345    Specimen: Blood Updated: 07/30/25 0422     Glucose 112 mg/dL      BUN 5.7 mg/dL      Creatinine 0.74 mg/dL      Sodium 135 mmol/L      Potassium 4.0 mmol/L      Chloride 107 mmol/L      CO2 17.9 mmol/L      Calcium 7.9 mg/dL      Total Protein 4.6 g/dL      Albumin 2.7 g/dL      ALT (SGPT) 5 U/L      AST (SGOT) 17 U/L      Alkaline Phosphatase 104 U/L      Total Bilirubin 0.7 mg/dL      Globulin 1.9 gm/dL      A/G Ratio 1.4 g/dL      BUN/Creatinine Ratio 7.7     Anion Gap 10.1 mmol/L      eGFR 79.9 mL/min/1.73     Narrative:      GFR Categories in Chronic Kidney Disease " (CKD)              GFR Category          GFR (mL/min/1.73)    Interpretation  G1                    90 or greater        Normal or high (1)  G2                    60-89                Mild decrease (1)  G3a                   45-59                Mild to moderate decrease  G3b                   30-44                Moderate to severe decrease  G4                    15-29                Severe decrease  G5                    14 or less           Kidney failure    (1)In the absence of evidence of kidney disease, neither GFR category G1 or G2 fulfill the criteria for CKD.    eGFR calculation 2021 CKD-EPI creatinine equation, which does not include race as a factor    CBC (No Diff) [[id number]]  (Abnormal) Collected: 07/30/25 0345    Specimen: Blood Updated: 07/30/25 0421     WBC 4.16 10*3/mm3      RBC 4.81 10*6/mm3      Hemoglobin 15.6 g/dL      Hematocrit 46.3 %      MCV 96.3 fL      MCH 32.4 pg      MCHC 33.7 g/dL      RDW 14.9 %      RDW-SD 53.1 fl      MPV 11.4 fL      Platelets 113 10*3/mm3     Urinalysis, Microscopic Only - Straight Cath [[id number]]  (Abnormal) Collected: 07/29/25 1409    Specimen: Urine from Straight Cath Updated: 07/29/25 1438     RBC, UA 0-2 /HPF      WBC, UA 3-5 /HPF      Bacteria, UA 3+ /HPF      Squamous Epithelial Cells, UA 0-2 /HPF      Hyaline Casts, UA None Seen /LPF      Mucus, UA Large/3+ /HPF      Methodology Manual Light Microscopy    Urinalysis With Microscopic If Indicated (No Culture) - Straight Cath [[id number]]  (Abnormal) Collected: 07/29/25 1409    Specimen: Urine from Straight Cath Updated: 07/29/25 1421     Color, UA Dark Yellow     Appearance, UA Clear     pH, UA 6.5     Specific Gravity, UA 1.020     Glucose, UA Negative     Ketones, UA 15 mg/dL (1+)     Bilirubin, UA Small (1+)     Blood, UA Negative     Protein, UA 30 mg/dL (1+)     Leuk Esterase, UA Negative     Nitrite, UA Negative     Urobilinogen, UA 4.0 E.U./dL    COVID-19, FLU A/B, RSV PCR 1 HR TAT - Swab,  Nasopharynx [[id number]]  (Normal) Collected: 07/29/25 1302    Specimen: Swab from Nasopharynx Updated: 07/29/25 1354     COVID19 Not Detected     Influenza A PCR Not Detected     Influenza B PCR Not Detected     RSV, PCR Not Detected    Comprehensive Metabolic Panel [[id number]]  (Abnormal) Collected: 07/29/25 1301    Specimen: Blood Updated: 07/29/25 1330     Glucose 114 mg/dL      BUN 7.9 mg/dL      Creatinine 0.77 mg/dL      Sodium 134 mmol/L      Potassium 3.8 mmol/L      Chloride 101 mmol/L      CO2 21.5 mmol/L      Calcium 8.6 mg/dL      Total Protein 5.3 g/dL      Albumin 3.0 g/dL      ALT (SGPT) 7 U/L      AST (SGOT) 21 U/L      Alkaline Phosphatase 118 U/L      Total Bilirubin 0.7 mg/dL      Globulin 2.3 gm/dL      A/G Ratio 1.3 g/dL      BUN/Creatinine Ratio 10.3     Anion Gap 11.5 mmol/L      eGFR 76.2 mL/min/1.73     Narrative:      GFR Categories in Chronic Kidney Disease (CKD)              GFR Category          GFR (mL/min/1.73)    Interpretation  G1                    90 or greater        Normal or high (1)  G2                    60-89                Mild decrease (1)  G3a                   45-59                Mild to moderate decrease  G3b                   30-44                Moderate to severe decrease  G4                    15-29                Severe decrease  G5                    14 or less           Kidney failure    (1)In the absence of evidence of kidney disease, neither GFR category G1 or G2 fulfill the criteria for CKD.    eGFR calculation 2021 CKD-EPI creatinine equation, which does not include race as a factor    Lipase [[id number]]  (Normal) Collected: 07/29/25 1301    Specimen: Blood Updated: 07/29/25 1330     Lipase 17 U/L     CBC & Differential [[id number]]  (Abnormal) Collected: 07/29/25 1301    Specimen: Blood Updated: 07/29/25 1320    Narrative:      The following orders were created for panel order CBC & Differential.  Procedure                               Abnormality          Status                     ---------                               -----------         ------                     CBC Auto Differential[[id number]]        Abnormal            Final result                 Please view results for these tests on the individual orders.    CBC Auto Differential [[id number]]  (Abnormal) Collected: 07/29/25 1301    Specimen: Blood Updated: 07/29/25 1320     WBC 7.48 10*3/mm3      RBC 4.23 10*6/mm3      Hemoglobin 13.7 g/dL      Hematocrit 42.6 %      .7 fL      MCH 32.4 pg      MCHC 32.2 g/dL      RDW 15.1 %      RDW-SD 55.8 fl      MPV 10.8 fL      Platelets 202 10*3/mm3      Neutrophil % 60.6 %      Lymphocyte % 29.4 %      Monocyte % 8.3 %      Eosinophil % 0.9 %      Basophil % 0.3 %      Immature Grans % 0.5 %      Neutrophils, Absolute 4.53 10*3/mm3      Lymphocytes, Absolute 2.20 10*3/mm3      Monocytes, Absolute 0.62 10*3/mm3      Eosinophils, Absolute 0.07 10*3/mm3      Basophils, Absolute 0.02 10*3/mm3      Immature Grans, Absolute 0.04 10*3/mm3      nRBC 0.0 /100 WBC     [place] Draw [[id number]] Collected: 07/29/25 1301    Specimen: Blood Updated: 07/29/25 1315    Narrative:      The following orders were created for panel order [place] Draw.  Procedure                               Abnormality         Status                     ---------                               -----------         ------                     Green Top (Gel)[[id number]]                                  Final result               Lavender Top[[id number]]                                     Final result               Gold Top - [facility][[id number]]                                   Final result                 Please view results for these tests on the individual orders.    Green Top (Gel) [[id number]] Collected: 07/29/25 1301    Specimen: Blood Updated: 07/29/25 1315     Extra Tube Hold for add-ons.     Comment: Auto resulted.       Lavender Top [[id number]] Collected: 07/29/25 1301    Specimen: Blood Updated:  07/29/25 1315     Extra Tube hold for add-on     Comment: Auto resulted       Gold Top - SST [[id number]] Collected: 07/29/25 1301    Specimen: Blood Updated: 07/29/25 1315     Extra Tube Hold for add-ons.     Comment: Auto resulted.               Imaging Results (Last 24 Hours)       ** No results found for the last 24 hours. **            Medication Review:   I have reviewed the patient's current medication list    Current Facility-Administered Medications:     acetaminophen (TYLENOL) tablet 650 mg, 650 mg, Oral, Q4H PRN **OR** acetaminophen (TYLENOL) 160 MG/5ML oral solution 650 mg, 650 mg, Oral, Q4H PRN **OR** acetaminophen (TYLENOL) suppository 650 mg, 650 mg, Rectal, Q4H PRN, Perrenoud, Norah, DO    atenolol (TENORMIN) tablet 25 mg, 25 mg, Oral, Daily, Miki Sandoval MD    Calcium Replacement - Follow Nurse / BPA Driven Protocol, , Not Applicable, PRN, Perrenoud, Norah, DO    dextrose 5 % and sodium chloride 0.45 % with KCl 20 mEq/L infusion, 100 mL/hr, Intravenous, Continuous, Perrenoud, Norah, DO, Last Rate: 100 mL/hr at 07/30/25 0504, 100 mL/hr at 07/30/25 0504    Magnesium Standard Dose Replacement - Follow Nurse / BPA Driven Protocol, , Not Applicable, PRN, Perrenoud, Norah, DO    ondansetron (ZOFRAN) injection 4 mg, 4 mg, Intravenous, Q6H PRN, Perrenoud, Norah, DO    Phosphorus Replacement - Follow Nurse / BPA Driven Protocol, , Not Applicable, PRN, Perrenoud, Norah, DO    Potassium Replacement - Follow Nurse / BPA Driven Protocol, , Not Applicable, PRN, Perrenoud, Norah, DO    [COMPLETED] Insert Peripheral IV, , , Once **AND** sodium chloride 0.9 % flush 10 mL, 10 mL, Intravenous, PRN, John, Siham Shamy, APRN    sodium chloride 0.9 % flush 10 mL, 10 mL, Intravenous, Q12H, Perrenoud, Norah, DO    sodium chloride 0.9 % flush 10 mL, 10 mL, Intravenous, PRN, Schuyler, Norah, DO    sodium chloride 0.9 % infusion 40 mL, 40 mL, Intravenous, PRN, Schuyler, Norah,  DO      Impressions/Recommendations:    Preoperative evaluation: EKG demonstrated dynamic T wave inversions in the anterior lateral leads that are new from March of this year.  Echocardiogram has been done but the report is pending.  May be worth having cardiology evaluate her as it is difficult to determine what her metabolic equivalents of activity are given her history.  Essential hypertension: Blood pressure near goal on exam.  Continue atenolol in the perioperative period.  Dementia: I suspect she is at her baseline.  As noted yesterday, she can resume her Aricept at discharge.    Plan for disposition: As per primary.    Miki Sandoval MD  07/30/25  09:34 EDT

## 2025-07-30 NOTE — CONSULTS
Patient Name: Jennifer pSrague  :1940  84 y.o.    Date of Admission: 2025  Date of Consultation:  25  Encounter Provider: Donal Ann III, MD  Place of Service: [facility]  Referring Provider: No ref. provider found  Patient Care Team:  Aimee Duque DO as PCP - General (Family Medicine)      Chief complaint: Generalized weakness    History of Present Illness:     Jennifer Sprague is a 84-year-old patient with a history of dementia, hyperlipidemia, hypertension and UTIs.    We have been asked to see her to assess cardiac risk prior to cholecystectomy.    This patient has no known cardiac history.  This patient has no history of coronary artery disease, congestive heart failure, rheumatic fever, rheumatic heart disease, congenital heart disease or heart murmur.  This patient has never required invasive cardiovascular evaluation.    Patient has issues with dementia.  I talked to her  as well as her daughter.  They are unaware of any prior cardiac issues, no recent complaints of chest pain, had been noted to have some fatigability and some shortness of breath here lately.  Main complaint has been weakness nausea and vomiting as well as abdominal pain.    Patient presented to [facility] on 2025 with complaints of nausea, vomiting and abdominal pain.  Patient was initially scheduled to undergo a lap alyssa on  due to cholelithiasis and continued symptoms.  Due to continued worsening symptoms she was brought in for hydration and to undergo laparoscopic cholecystectomy tomorrow.  UA showed 3+ bacteria.  Respiratory panel negative.  BUN 7.9, creatinine 0.77, sodium 134, albumin 3.0, alkaline phosphatase 118,       Patient had an EKG on 729 that showed sinus or ectopic atrial rhythm with repolarization abnormality, probable ischemia, anterolateral leads.     Because of the abnormal EKG we are asked to see.  She had an  echocardiogram performed today:  Results for orders placed during the hospital encounter of 25    Adult Transthoracic Echo Complete W/ Cont if Necessary Per Protocol    Interpretation Summary    Left ventricular systolic function is hyperdynamic (EF > 70%). Calculated left ventricular EF = 74.6%    Left ventricular diastolic function was normal.    Because of the abnormal EKG I requested the troponin be run on her admission sample and this came back elevated at 20.  We have ordered a repeat EKG and repeat troponin, pending at this time of this dictation.    Past Medical History:   Diagnosis Date    Dementia     H/O partial resection of colon     d/t obstruction    History of colon polyps     Hyperlipidemia     Hypertension     Osteopenia     Urinary tract infection        Past Surgical History:   Procedure Laterality Date    APPENDECTOMY      BACK SURGERY      Disc surgery lower back,     CATARACT EXTRACTION, BILATERAL      COLONOSCOPY      ENDOSCOPY      EXPLORATORY LAPAROTOMY W/ BOWEL RESECTION  2016    Exp. Lap for SBO - had SB resection, adhesiolysis    HYSTERECTOMY           Prior to Admission medications    Medication Sig Start Date End Date Taking? Authorizing Provider   atenolol (TENORMIN) 25 MG tablet Take 1 tablet by mouth Daily. 25  Yes Aimee Duque, DO       No Known Allergies    Social History     Socioeconomic History    Marital status:    Tobacco Use    Smoking status: Former     Current packs/day: 0.00     Average packs/day: 0.5 packs/day for 47.2 years (23.6 ttl pk-yrs)     Types: Cigarettes     Start date: 1960     Quit date: 2007     Years since quittin.9    Smokeless tobacco: Never   Vaping Use    Vaping status: Never Used   Substance and Sexual Activity    Alcohol use: Not Currently     Alcohol/week: 1.0 standard drink of alcohol     Types: 1 Cans of beer per week     Comment: ABOUT EVERY 2 WEEKS, WITH PIZZA    Drug use: Never    Sexual  "activity: Not Currently     Partners: Male       Family History   Problem Relation Age of Onset    Lupus Mother     Malig Hyperthermia Neg Hx        REVIEW OF SYSTEMS:   All systems reviewed.  Pertinent positives identified in HPI.  All other systems are negative.      Objective:     Vitals:    07/30/25 0802 07/30/25 0851 07/30/25 1134 07/30/25 1540   BP: 152/72 152/72 132/63 179/71   BP Location: Left arm  Left arm Left arm   Patient Position: Sitting  Lying Lying   Pulse: 79 79 66 69   Resp: 18  16 16   Temp: 97.5 °F (36.4 °C)  97.8 °F (36.6 °C) 97.5 °F (36.4 °C)   TempSrc: Oral  Oral Oral   SpO2: 97%  96% 99%   Weight:  46 kg (101 lb 6.6 oz)     Height:  160 cm (62.99\")       Body mass index is 17.97 kg/m².    Physical Exam:  General Appearance:  Frail   Head:    Normocephalic, without obvious abnormality, atraumatic   Eyes:            Lids and lashes normal, conjunctivae and sclerae normal, no   icterus, no pallor, corneas clear, PERRLA   Ears:    Ears appear intact with no abnormalities noted   Throat:   No oral lesions, no thrush, oral mucosa moist   Neck:   No adenopathy, supple, trachea midline, no thyromegaly, no   carotid bruit, no JVD   Back:     No kyphosis present, no scoliosis present, no skin lesions, erythema or scars, no tenderness to percussion or palpation, range of motion normal   Lungs:     Clear to auscultation,respirations regular, even and unlabored    Heart:    Regular rhythm and normal rate, normal S1 and S2, no murmur, no gallop, no rub, no click   Chest Wall:    No abnormalities observed   Abdomen:     Normal bowel sounds, no masses, no organomegaly, soft        non-tender, non-distended, no guarding, no rebound  tenderness   Extremities:   Moves all extremities well, no edema, no cyanosis, no redness   Pulses:   Pulses palpable and equal bilaterally. Normal radial, carotid, femoral, dorsalis pedis and posterior tibial pulses bilaterally. Normal abdominal aorta   Skin:  Psychiatric:   No " bleeding, bruising or rash    Alert pleasant         Lab Review:     Results from last 7 days   Lab Units 07/30/25  0345   SODIUM mmol/L 135*   POTASSIUM mmol/L 4.0   CHLORIDE mmol/L 107   CO2 mmol/L 17.9*   BUN mg/dL 5.7*   CREATININE mg/dL 0.74   CALCIUM mg/dL 7.9*   BILIRUBIN mg/dL 0.7   ALK PHOS U/L 104   ALT (SGPT) U/L 5   AST (SGOT) U/L 17   GLUCOSE mg/dL 112*     Results from last 7 days   Lab Units 07/30/25  1601 07/30/25  0345   HSTROP T ng/L 17* 21*     Results from last 7 days   Lab Units 07/30/25  0345   WBC 10*3/mm3 4.16   HEMOGLOBIN g/dL 15.6   HEMATOCRIT % 46.3   PLATELETS 10*3/mm3 113*                                                       I personally viewed and interpreted the patient's EKG/Telemetry data.      Current Facility-Administered Medications:     acetaminophen (TYLENOL) tablet 650 mg, 650 mg, Oral, Q4H PRN **OR** acetaminophen (TYLENOL) 160 MG/5ML oral solution 650 mg, 650 mg, Oral, Q4H PRN **OR** acetaminophen (TYLENOL) suppository 650 mg, 650 mg, Rectal, Q4H PRN, Norah Payan, DO    atenolol (TENORMIN) tablet 25 mg, 25 mg, Oral, Daily, Miki Sandoval MD, 25 mg at 07/30/25 0950    Calcium Replacement - Follow Nurse / BPA Driven Protocol, , Not Applicable, PRN, Lori Payanine, DO    dextrose 5 % and sodium chloride 0.45 % with KCl 20 mEq/L infusion, 100 mL/hr, Intravenous, Continuous, Norah Payan, DO, Last Rate: 100 mL/hr at 07/30/25 0504, 100 mL/hr at 07/30/25 0504    Magnesium Standard Dose Replacement - Follow Nurse / BPA Driven Protocol, , Not Applicable, PRN, PerLori munguiaine, DO    ondansetron (ZOFRAN) injection 4 mg, 4 mg, Intravenous, Q6H PRN, Lori Payanine, DO    Phosphorus Replacement - Follow Nurse / BPA Driven Protocol, , Not Applicable, PRN, Perrenoud, Norah, DO    Potassium Replacement - Follow Nurse / BPA Driven Protocol, , Not Applicable, Schuyler HUFFMAN Jeannine, DO    [COMPLETED] Insert Peripheral IV, , , Once **AND** sodium chloride 0.9  % flush 10 mL, 10 mL, Intravenous, PRN, John, Jerrod Shamy, APRN    sodium chloride 0.9 % flush 10 mL, 10 mL, Intravenous, Q12H, Perrenoud, Norah, DO    sodium chloride 0.9 % flush 10 mL, 10 mL, Intravenous, PRN, Perrenoud, Norah, DO    sodium chloride 0.9 % infusion 40 mL, 40 mL, Intravenous, PRN, Perrenoud, Norah, DO    Assessment and Plan:       Active Hospital Problems    Diagnosis  POA    **Abdominal pain [R10.9]  Yes    Moderate protein-calorie malnutrition [E44.0]  Yes      Resolved Hospital Problems   No resolved problems to display.     1.  Assessment of cardiac risk-undefined cardiac risk at this point.  Patient's EKG is concerning for ischemic heart disease although her echocardiogram shows normal biventricular size and function with no wall motion abnormalities.  Initial troponin is elevated at 20 which is a very mild elevation, I have ordered a repeat troponin which is pending at the time of the dictation.  However, patient is not a candidate for an acute ischemic evaluation due to her her cholelithiasis and active symptoms.  I had a lengthy discussion with her  as well as with her daughter.    ADDENDUM: cTn level repeat value reviewed, NSC, ECg repeated, NSC, at this point would proceed with the anticipated surgery, no further actions for us to take to further assess or lower risk given current clinical scenerio. Continue atenolol including morning of surgery    Donal Ann III, MD  07/30/25  16:26 EDT

## 2025-07-30 NOTE — SIGNIFICANT NOTE
07/30/25 1100   OTHER   Discipline physical therapist   Rehab Time/Intention   Session Not Performed patient/family declined evaluation  (PT: Patient declines PT evaluation. Reports she is feeling better and has been able ambulate in room without concern. She reports she does not use an AD at baseline and denies need for one currently. Patient to have lap alyssa tomorrow. No inpatient skilled PT needs at this time.)

## 2025-07-30 NOTE — CASE MANAGEMENT/SOCIAL WORK
Discharge Planning Assessment   Jessica     Patient Name: Jennifer Sprague  MRN: [id number]  Today's Date: 7/30/2025    Admit Date: 7/29/2025    Plan: home with    Discharge Needs Assessment       Row Name 07/30/25 1142       Living Environment    People in Home spouse    Current Living Arrangements home    Primary Care Provided by self;spouse/significant other    Provides Primary Care For no one    Family Caregiver if Needed child(connie), adult;spouse    Quality of Family Relationships helpful;involved;supportive    Able to Return to Prior Arrangements yes       Resource/Environmental Concerns    Transportation Concerns none       Transition Planning    Patient/Family Anticipates Transition to home with family    Patient/Family Anticipated Services at Transition none    Transportation Anticipated family or friend will provide       Discharge Needs Assessment    Readmission Within the Last 30 Days no previous admission in last 30 days    Equipment Currently Used at Home none    Concerns to be Addressed no discharge needs identified    Anticipated Changes Related to Illness none                   Discharge Plan       Row Name 07/30/25 1144       Plan    Plan home with     Patient/Family in Agreement with Plan yes    Plan Comments CCP met with patient,  and daughter at bedside and introduced self/ role of CCP. Pt gave permission to speak with visitors present. Face sheet and pharmacy verified. Pt lives with her  in a single-story home that has a basement and zero steps to enter. Pt notes that he has difficulty with steps and cannot access basement. Pt is independent with most ADL's but her  and daughter assist with more strenuous like cooking and cleaning. Pt denies owning DME. Pt does not drive; her  does. Pt states she has a living will. Pt uses [facility] as her pharmacy, denies trouble affording medications and wants enrollment in meds to beds for this  admission. Pt has used HH in the past, unsure of agency. Pt denies hx of SNF. Dc plan is to return home, stating that her  can transport home. Renata KIDD RN/CCP                  Continued Care and Services - Admitted Since 7/29/2025    No active coordination exists.          Demographic Summary       Row Name 07/30/25 1142       General Information    Admission Type inpatient    Arrived From home    Required Notices Provided Important Message from Medicare    Referral Source admission list;case finding    Reason for Consult discharge planning    Preferred Language English       Contact Information    Permission Granted to Share Info With ;family/designee                   Functional Status       Row Name 07/30/25 1142       Functional Status    Usual Activity Tolerance good    Current Activity Tolerance good       Assessment of Health Literacy    How often do you have someone help you read hospital materials? Never    How often do you have problems learning about your medical condition because of difficulty understanding written information? Occasionally    How often do you have a problem understanding what is told to you about your medical condition? Occasionally                   Psychosocial    No documentation.                  Abuse/Neglect    No documentation.                  Legal    No documentation.                  Substance Abuse    No documentation.                  Patient Forms    No documentation.                     Renata Samuel RN

## 2025-07-30 NOTE — H&P
General Surgery      Patient Care Team:  Aimee Duque DO as PCP - General (Family Medicine)    CHIEF COMPLAINT: Nausea, vomiting, failure to thrive    HISTORY OF PRESENT ILLNESS:    This very sweet 84-year-old female is scheduled to have a laparoscopic cholecystectomy for gallstones on .  Unfortunately she was having a lot of issues with nausea and not eating.  She presented to the emergency department yesterday and was admitted to the hospital for IV fluids.  Internal medicine was consulted for management of her medical issues.  She had EKG which was abnormal and an echocardiogram was performed.  She is feeling much better this morning.  She tolerated her breakfast.  She denies pain.      Past Medical History:   Diagnosis Date    Dementia     H/O partial resection of colon     d/t obstruction    History of colon polyps     Hyperlipidemia     Hypertension     Osteopenia     Urinary tract infection      Past Surgical History:   Procedure Laterality Date    APPENDECTOMY      BACK SURGERY      Disc surgery lower back,     CATARACT EXTRACTION, BILATERAL      COLONOSCOPY      ENDOSCOPY      EXPLORATORY LAPAROTOMY W/ BOWEL RESECTION  2016    Exp. Lap for SBO - had SB resection, adhesiolysis    HYSTERECTOMY       Family History   Problem Relation Age of Onset    Lupus Mother     Malig Hyperthermia Neg Hx      Social History     Tobacco Use    Smoking status: Former     Current packs/day: 0.00     Average packs/day: 0.5 packs/day for 47.2 years (23.6 ttl pk-yrs)     Types: Cigarettes     Start date: 1960     Quit date: 2007     Years since quittin.9    Smokeless tobacco: Never   Vaping Use    Vaping status: Never Used   Substance Use Topics    Alcohol use: Not Currently     Alcohol/week: 1.0 standard drink of alcohol     Types: 1 Cans of beer per week     Comment: ABOUT EVERY 2 WEEKS, WITH PIMANJIT    Drug use: Never     Medications Prior to Admission   Medication Sig Dispense Refill  "Last Dose/Taking    atenolol (TENORMIN) 25 MG tablet Take 1 tablet by mouth Daily. 90 tablet 3 7/29/2025 Morning     Allergies:  Patient has no known allergies.    REVIEW OF SYSTEMS:  Please see the above history of present illness for pertinent positives and negatives.  The remainder of the patient's systems have been reviewed and are negative.     Vital Signs  Temp:  [97.2 °F (36.2 °C)-97.8 °F (36.6 °C)] 97.8 °F (36.6 °C)  Heart Rate:  [63-82] 66  Resp:  [16-20] 16  BP: (132-170)/(58-75) 132/63    Flowsheet Rows      Flowsheet Row First Filed Value   Admission Height 160 cm (63\") Documented at 07/29/2025 1238   Admission Weight 45.1 kg (99 lb 6.8 oz) Documented at 07/29/2025 1238             Physical Exam:  Physical Exam   Constitutional: Patient appears well-developed and well-nourished and in no acute distress   Abdominal: Soft, no distention, no tenderness  Musculoskeletal: Normal posture.  Extremities: No edema.   Neurological: Patient is alert and oriented.  Psychological:   Mood and behavior appropriate.  Skin: Skin is warm and dry.    Debilities/Disabilities Identified: Dementia    Emotional Behavior: Appropriate           Results Review:    I reviewed the patient's new clinical results.  Lab Results (most recent)       Procedure Component Value Units Date/Time    Comprehensive Metabolic Panel [[id number]]  (Abnormal) Collected: 07/30/25 0345    Specimen: Blood Updated: 07/30/25 0422     Glucose 112 mg/dL      BUN 5.7 mg/dL      Creatinine 0.74 mg/dL      Sodium 135 mmol/L      Potassium 4.0 mmol/L      Chloride 107 mmol/L      CO2 17.9 mmol/L      Calcium 7.9 mg/dL      Total Protein 4.6 g/dL      Albumin 2.7 g/dL      ALT (SGPT) 5 U/L      AST (SGOT) 17 U/L      Alkaline Phosphatase 104 U/L      Total Bilirubin 0.7 mg/dL      Globulin 1.9 gm/dL      A/G Ratio 1.4 g/dL      BUN/Creatinine Ratio 7.7     Anion Gap 10.1 mmol/L      eGFR 79.9 mL/min/1.73     Narrative:      GFR Categories in Chronic Kidney " Disease (CKD)              GFR Category          GFR (mL/min/1.73)    Interpretation  G1                    90 or greater        Normal or high (1)  G2                    60-89                Mild decrease (1)  G3a                   45-59                Mild to moderate decrease  G3b                   30-44                Moderate to severe decrease  G4                    15-29                Severe decrease  G5                    14 or less           Kidney failure    (1)In the absence of evidence of kidney disease, neither GFR category G1 or G2 fulfill the criteria for CKD.    eGFR calculation 2021 CKD-EPI creatinine equation, which does not include race as a factor    CBC (No Diff) [[id number]]  (Abnormal) Collected: 07/30/25 0345    Specimen: Blood Updated: 07/30/25 0421     WBC 4.16 10*3/mm3      RBC 4.81 10*6/mm3      Hemoglobin 15.6 g/dL      Hematocrit 46.3 %      MCV 96.3 fL      MCH 32.4 pg      MCHC 33.7 g/dL      RDW 14.9 %      RDW-SD 53.1 fl      MPV 11.4 fL      Platelets 113 10*3/mm3     Urinalysis, Microscopic Only - Straight Cath [[id number]]  (Abnormal) Collected: 07/29/25 1409    Specimen: Urine from Straight Cath Updated: 07/29/25 1438     RBC, UA 0-2 /HPF      WBC, UA 3-5 /HPF      Bacteria, UA 3+ /HPF      Squamous Epithelial Cells, UA 0-2 /HPF      Hyaline Casts, UA None Seen /LPF      Mucus, UA Large/3+ /HPF      Methodology Manual Light Microscopy    Urinalysis With Microscopic If Indicated (No Culture) - Straight Cath [[id number]]  (Abnormal) Collected: 07/29/25 1409    Specimen: Urine from Straight Cath Updated: 07/29/25 1421     Color, UA Dark Yellow     Appearance, UA Clear     pH, UA 6.5     Specific Gravity, UA 1.020     Glucose, UA Negative     Ketones, UA 15 mg/dL (1+)     Bilirubin, UA Small (1+)     Blood, UA Negative     Protein, UA 30 mg/dL (1+)     Leuk Esterase, UA Negative     Nitrite, UA Negative     Urobilinogen, UA 4.0 E.U./dL    COVID-19, FLU A/B, RSV PCR 1 HR TAT - Swab,  Nasopharynx [[id number]]  (Normal) Collected: 07/29/25 1302    Specimen: Swab from Nasopharynx Updated: 07/29/25 1354     COVID19 Not Detected     Influenza A PCR Not Detected     Influenza B PCR Not Detected     RSV, PCR Not Detected    Comprehensive Metabolic Panel [[id number]]  (Abnormal) Collected: 07/29/25 1301    Specimen: Blood Updated: 07/29/25 1330     Glucose 114 mg/dL      BUN 7.9 mg/dL      Creatinine 0.77 mg/dL      Sodium 134 mmol/L      Potassium 3.8 mmol/L      Chloride 101 mmol/L      CO2 21.5 mmol/L      Calcium 8.6 mg/dL      Total Protein 5.3 g/dL      Albumin 3.0 g/dL      ALT (SGPT) 7 U/L      AST (SGOT) 21 U/L      Alkaline Phosphatase 118 U/L      Total Bilirubin 0.7 mg/dL      Globulin 2.3 gm/dL      A/G Ratio 1.3 g/dL      BUN/Creatinine Ratio 10.3     Anion Gap 11.5 mmol/L      eGFR 76.2 mL/min/1.73     Narrative:      GFR Categories in Chronic Kidney Disease (CKD)              GFR Category          GFR (mL/min/1.73)    Interpretation  G1                    90 or greater        Normal or high (1)  G2                    60-89                Mild decrease (1)  G3a                   45-59                Mild to moderate decrease  G3b                   30-44                Moderate to severe decrease  G4                    15-29                Severe decrease  G5                    14 or less           Kidney failure    (1)In the absence of evidence of kidney disease, neither GFR category G1 or G2 fulfill the criteria for CKD.    eGFR calculation 2021 CKD-EPI creatinine equation, which does not include race as a factor    Lipase [[id number]]  (Normal) Collected: 07/29/25 1301    Specimen: Blood Updated: 07/29/25 1330     Lipase 17 U/L     CBC & Differential [[id number]]  (Abnormal) Collected: 07/29/25 1301    Specimen: Blood Updated: 07/29/25 1320    Narrative:      The following orders were created for panel order CBC & Differential.  Procedure                               Abnormality          Status                     ---------                               -----------         ------                     CBC Auto Differential[[id number]]        Abnormal            Final result                 Please view results for these tests on the individual orders.    CBC Auto Differential [[id number]]  (Abnormal) Collected: 07/29/25 1301    Specimen: Blood Updated: 07/29/25 1320     WBC 7.48 10*3/mm3      RBC 4.23 10*6/mm3      Hemoglobin 13.7 g/dL      Hematocrit 42.6 %      .7 fL      MCH 32.4 pg      MCHC 32.2 g/dL      RDW 15.1 %      RDW-SD 55.8 fl      MPV 10.8 fL      Platelets 202 10*3/mm3      Neutrophil % 60.6 %      Lymphocyte % 29.4 %      Monocyte % 8.3 %      Eosinophil % 0.9 %      Basophil % 0.3 %      Immature Grans % 0.5 %      Neutrophils, Absolute 4.53 10*3/mm3      Lymphocytes, Absolute 2.20 10*3/mm3      Monocytes, Absolute 0.62 10*3/mm3      Eosinophils, Absolute 0.07 10*3/mm3      Basophils, Absolute 0.02 10*3/mm3      Immature Grans, Absolute 0.04 10*3/mm3      nRBC 0.0 /100 WBC     [place] Draw [[id number]] Collected: 07/29/25 1301    Specimen: Blood Updated: 07/29/25 1315    Narrative:      The following orders were created for panel order [place] Draw.  Procedure                               Abnormality         Status                     ---------                               -----------         ------                     Green Top (Gel)[[id number]]                                  Final result               Lavender Top[[id number]]                                     Final result               Gold Top - [facility][[id number]]                                   Final result                 Please view results for these tests on the individual orders.    Green Top (Gel) [[id number]] Collected: 07/29/25 1301    Specimen: Blood Updated: 07/29/25 1315     Extra Tube Hold for add-ons.     Comment: Auto resulted.       Lavender Top [[id number]] Collected: 07/29/25 1301    Specimen: Blood Updated:  07/29/25 1315     Extra Tube hold for add-on     Comment: Auto resulted       Gold Top - [facility] [[id number]] Collected: 07/29/25 1301    Specimen: Blood Updated: 07/29/25 1315     Extra Tube Hold for add-ons.     Comment: Auto resulted.               Imaging Results (Most Recent)       None          Reviewed    ECG/EMG Results (most recent)       Procedure Component Value Units Date/Time    ECG 12 Lead Pre-Op / Pre-Procedure [[id number]] Collected: 07/29/25 1854     Updated: 07/29/25 1856     QT Interval 471 ms      QTC Interval 501 ms     Narrative:      HEART RATE=68  bpm  RR Qhtyxuhw=904  ms  SD Interval=67  ms  P Horizontal Axis=160  deg  P Front Axis=224  deg  QRSD Interval=85  ms  QT Ehwvvtyr=420  ms  OXmP=569  ms  QRS Axis=48  deg  T Wave Axis=193  deg  - ABNORMAL ECG -  Sinus or ectopic atrial rhythm  Short SD interval  Repol abnrm, prob ischemia, anterolateral lds  Prolonged QT interval  Date and Time of Study:2025-07-29 18:54:56    Adult Transthoracic Echo Complete W/ Cont if Necessary Per Protocol - In process [[id number]] Resulted: 07/30/25 0902     Updated: 07/30/25 0902    This result has not been signed. Information might be incomplete.       EF(MOD-bp) 74.6 %      LVIDd 3.2 cm      LVIDs 2.37 cm      IVSd 0.90 cm      LVPWd 0.80 cm      FS 25.9 %      IVS/LVPW 1.13 cm      ESV(cubed) 13.3 ml      LV Sys Vol (BSA corrected) 8.3 cm2      EDV(cubed) 32.8 ml      LV Zhou Vol (BSA corrected) 29.0 cm2      LV mass(C)d 71.2 grams      LVOT area 2.9 cm2      LVOT diam 1.92 cm      EDV(MOD-sp2) 51.0 ml      EDV(MOD-sp4) 42.0 ml      ESV(MOD-sp2) 12.0 ml      ESV(MOD-sp4) 12.0 ml      SV(MOD-sp2) 39.0 ml      SV(MOD-sp4) 30.0 ml      SVi(MOD-SP2) 26.9 ml/m2      SVi(MOD-SP4) 20.7 ml/m2      SVi (LVOT) 68.5 ml/m2      EF(MOD-sp2) 76.5 %      EF(MOD-sp4) 71.4 %      MV E max vivek 90.3 cm/sec      MV A max vivek 111.0 cm/sec      MV dec time 0.22 sec      MV E/A 0.81     LA ESV Index (BP) 13.0 ml/m2      Med Peak E' Vivek  5.0 cm/sec      Lat Peak E' Vivek 8.2 cm/sec      Avg E/e' ratio 13.68     SV(LVOT) 99.2 ml      SV(RVOT) 36.8 ml      Qp/Qs 0.37     RV Base 3.0 cm      RV Mid 2.25 cm      RV Length 6.8 cm      TAPSE (>1.6) 2.22 cm      RV S' 14.7 cm/sec      LV V1 max 151.0 cm/sec      LV V1 max PG 9.1 mmHg      LV V1 mean PG 5.0 mmHg      LV V1 VTI 34.2 cm      Ao pk vivek 156.0 cm/sec      Ao max PG 9.7 mmHg      Ao mean PG 6.0 mmHg      Ao V2 VTI 35.0 cm      COREY(I,D) 2.8 cm2      Dimensionless Index 0.98 (DI)      MV max PG 5.7 mmHg      MV mean PG 1.86 mmHg      MV V2 VTI 35.1 cm      MV P1/2t 76.3 msec      MVA(P1/2t) 2.9 cm2      MVA(VTI) 2.8 cm2      MV dec slope 415.8 cm/sec2      RVOT diam 1.58 cm      RV V1 max PG 2.34 mmHg      RV V1 max 76.5 cm/sec      RV V1 VTI 18.8 cm      PA V2 max 90.6 cm/sec      PA acc time 0.18 sec      Ao root diam 2.17 cm      Sinus 2.45 cm               Assessment & Plan     Gallstones  Nausea with vomiting  Failure to thrive  I discussed with Dr. Sandoval having cardiology cleared for surgery.  I discussed taking her to surgery tomorrow.  I discussed the procedure in detail with the patient and her family.  All of her questions were answered and they are willing to proceed.      Norah Payan DO  07/30/25  11:56 EDT

## 2025-07-30 NOTE — PROGRESS NOTES
"Adult Nutrition  Assessment/PES    Patient Name:  Jennifer Sprague  YOB: 1940  MRN: [id number]  Admit Date:  7/29/2025    Assessment Date:  7/30/2025    Comments:  MST, BMI    Pt meets criteria for:    Moderate chronic disease related malnutrition related to dementia and adv age as evidenced by 10% wt loss in 6 months, muscle and fat loss on exam.     Adv diet as indicated post op  May benefit from oral supplement post op    Will cont to follow and monitor.      Reason for Assessment       Row Name 07/30/25 1103          Reason for Assessment    Reason For Assessment identified at risk by screening criteria     Diagnosis gastrointestinal disease;neurologic conditions  Gallstone Colthestasis pending surgery, dementia     Identified At Risk by Screening Criteria MST SCORE 2+                    Nutrition/Diet History       Row Name 07/30/25 1103          Nutrition/Diet History    Typical Intake (Food/Fluid/EN/PN) Pt up in chair. unsure if wt loss. appetite down some. noted disoriented x 2 per notes                    Labs/Tests/Procedures/Meds       Row Name 07/30/25 1104          Labs/Procedures/Meds    Lab Results Reviewed reviewed        Diagnostic Tests/Procedures    Diagnostic Test/Procedure Reviewed reviewed        Medications    Pertinent Medications Reviewed reviewed                      Estimated/Assessed Needs - Anthropometrics       Row Name 07/30/25 1104 07/30/25 0851       Anthropometrics    Height -- 160 cm (62.99\")    Weight -- 46 kg (101 lb 6.6 oz)    Calculation Weight 46 kg (101 lb 6.6 oz) --       Estimated/Assessed Needs    Additional Documentation Estimated Calorie Needs (Group);Fluid Requirements (Group);Protein Requirements (Group) --       Estimated Calorie Needs    Estimated Calorie Require (kcal/day) 0127-4375 kcal ( 25-30 kcal/kg ) --    Estimated Calorie Need Method kcal/kg --       Protein Requirements    Est Protein Requirement Amount (gms/kg) 1.2 gm protein  55 gm pro -- "       Fluid Requirements    Estimated Fluid Requirement Method RDA Method  3157-0272 ml --                   Nutrition Prescription Ordered       Row Name 07/30/25 1105          Nutrition Prescription PO    Current PO Diet Clear Liquid                    Evaluation of Received Nutrient/Fluid Intake       Row Name 07/30/25 1100          Fluid Intake Evaluation    Oral Fluid (mL) --  insufficient data        PO Evaluation    Number of Days PO Intake Evaluated Insufficient Data                    Malnutrition Severity Assessment       Row Name 07/30/25 1102          Malnutrition Severity Assessment    Malnutrition Type Chronic Disease - Related Malnutrition        Unintentional Weight Loss     Unintentional Weight Loss  Weight loss of 10% in six months        Muscle Loss    Temple Region Moderate - slight depression     Clavicle Bone Region Moderate - some protrusion in females, visible in males     Acromion Bone Region Moderate - acromion may slightly protrude     Patellar Region Moderate - patella more prominent, less muscle definition around patella     Anterior Thigh Region --  difficult to view     Posterior Calf Region Moderate - some roundness, slight firmness        Fat Loss    Orbital Region  Severe - pronounced hollowness/depression, dark circles, loose saggy skin     Upper Arm Region Moderate - some fat tissue, not ample        Criteria Met (Must meet criteria for severity in at least 2 of these categories: M Wasting, Fat Loss, Fluid, Secondary Signs, Wt. Status, Intake)    Patient meets criteria for  Moderate (non-severe) Malnutrition                     Problem/Interventions:   Problem 1       Row Name 07/30/25 1103          Nutrition Diagnoses Problem 1    Problem 1 Other (comment)  moderate chronic disease related malnutrition related to dementia and adv age as evidenced by 10% wt loss in 6 months, muscle and fat loss on exam.                          Intervention Goal       Row Name 07/30/25 4119           Intervention Goal    General Meet nutritional needs for age/condition     PO Establish PO;PO intake (%);Advance diet     PO Intake % 50 %                    Nutrition Intervention       Row Name 07/30/25 1108          Nutrition Intervention    RD/Tech Action Encourage intake;Follow Tx progress                    Nutrition Prescription       Row Name 07/30/25 1108          Other Orders    Other Adv diet as indicated                    Education/Evaluation       Row Name 07/30/25 1108          Education    Education Other (comment)  Edu on clears. Edu on nutrition exam. Edu on easy pro sources        Monitor/Evaluation    Monitor Per protocol;I&O;PO intake;Pertinent labs;Weight     Education Follow-up Other (comment)  pt verbalized understanding                     Electronically signed by:  Sarah Contreras RD  07/30/25 11:09 EDT

## 2025-07-30 NOTE — PLAN OF CARE
Goal Outcome Evaluation:              Outcome Evaluation: AOx1-2, planned for gallbladder removal 7/31 with [facility], echo in am, RA, VSS, cl liq, brief/clarisse

## 2025-07-31 ENCOUNTER — ANESTHESIA (OUTPATIENT)
Dept: PERIOP | Facility: HOSPITAL | Age: 85
End: 2025-07-31
Payer: MEDICARE

## 2025-07-31 ENCOUNTER — APPOINTMENT (OUTPATIENT)
Dept: GENERAL RADIOLOGY | Facility: HOSPITAL | Age: 85
End: 2025-07-31
Payer: MEDICARE

## 2025-07-31 LAB
ALBUMIN SERPL-MCNC: 2.8 G/DL (ref 3.5–5.2)
ALBUMIN/GLOB SERPL: 1.4 G/DL
ALP SERPL-CCNC: 108 U/L (ref 39–117)
ALT SERPL W P-5'-P-CCNC: 6 U/L (ref 1–33)
ANION GAP SERPL CALCULATED.3IONS-SCNC: 10.5 MMOL/L (ref 5–15)
AST SERPL-CCNC: 22 U/L (ref 1–32)
BILIRUB SERPL-MCNC: 0.7 MG/DL (ref 0–1.2)
BUN SERPL-MCNC: 3.7 MG/DL (ref 8–23)
BUN/CREAT SERPL: 5.4 (ref 7–25)
CALCIUM SPEC-SCNC: 8.4 MG/DL (ref 8.6–10.5)
CHLORIDE SERPL-SCNC: 106 MMOL/L (ref 98–107)
CO2 SERPL-SCNC: 17.5 MMOL/L (ref 22–29)
CREAT SERPL-MCNC: 0.68 MG/DL (ref 0.57–1)
EGFRCR SERPLBLD CKD-EPI 2021: 86 ML/MIN/1.73
GEN 5 1HR TROPONIN T REFLEX: 18 NG/L
GLOBULIN UR ELPH-MCNC: 2 GM/DL
GLUCOSE SERPL-MCNC: 93 MG/DL (ref 65–99)
POTASSIUM SERPL-SCNC: 4.1 MMOL/L (ref 3.5–5.2)
PROT SERPL-MCNC: 4.8 G/DL (ref 6–8.5)
QT INTERVAL: 415 MS
QT INTERVAL: 471 MS
QTC INTERVAL: 446 MS
QTC INTERVAL: 501 MS
SODIUM SERPL-SCNC: 134 MMOL/L (ref 136–145)
TROPONIN T % DELTA: 6
TROPONIN T NUMERIC DELTA: 1 NG/L

## 2025-07-31 PROCEDURE — 25010000002 SUGAMMADEX 200 MG/2ML SOLUTION: Performed by: NURSE ANESTHETIST, CERTIFIED REGISTERED

## 2025-07-31 PROCEDURE — 94761 N-INVAS EAR/PLS OXIMETRY MLT: CPT

## 2025-07-31 PROCEDURE — 25010000002 DEXAMETHASONE PER 1 MG

## 2025-07-31 PROCEDURE — 25810000003 LACTATED RINGERS PER 1000 ML: Performed by: SURGERY

## 2025-07-31 PROCEDURE — 25010000002 CEFAZOLIN PER 500 MG: Performed by: SURGERY

## 2025-07-31 PROCEDURE — G0378 HOSPITAL OBSERVATION PER HR: HCPCS

## 2025-07-31 PROCEDURE — 47563 LAPARO CHOLECYSTECTOMY/GRAPH: CPT | Performed by: SURGERY

## 2025-07-31 PROCEDURE — 25010000002 ONDANSETRON PER 1 MG

## 2025-07-31 PROCEDURE — C1894 INTRO/SHEATH, NON-LASER: HCPCS | Performed by: SURGERY

## 2025-07-31 PROCEDURE — 25010000002 FAMOTIDINE 10 MG/ML SOLUTION

## 2025-07-31 PROCEDURE — 25810000003 LACTATED RINGERS PER 1000 ML

## 2025-07-31 PROCEDURE — 25010000002 BUPIVACAINE (PF) 0.25 % SOLUTION 10 ML VIAL: Performed by: SURGERY

## 2025-07-31 PROCEDURE — 25010000002 KETOROLAC TROMETHAMINE PER 15 MG: Performed by: NURSE ANESTHETIST, CERTIFIED REGISTERED

## 2025-07-31 PROCEDURE — 25510000001 IOPAMIDOL 61 % SOLUTION: Performed by: SURGERY

## 2025-07-31 PROCEDURE — 25010000002 PROPOFOL 10 MG/ML EMULSION: Performed by: NURSE ANESTHETIST, CERTIFIED REGISTERED

## 2025-07-31 PROCEDURE — 25010000002 PHENYLEPHRINE 10 MG/ML SOLUTION 5 ML VIAL: Performed by: NURSE ANESTHETIST, CERTIFIED REGISTERED

## 2025-07-31 PROCEDURE — 88304 TISSUE EXAM BY PATHOLOGIST: CPT | Performed by: SURGERY

## 2025-07-31 PROCEDURE — 25810000003 SODIUM CHLORIDE 0.9 % SOLUTION 250 ML FLEX CONT: Performed by: NURSE ANESTHETIST, CERTIFIED REGISTERED

## 2025-07-31 PROCEDURE — 25810000003 SODIUM CHLORIDE PER 500 ML: Performed by: SURGERY

## 2025-07-31 PROCEDURE — 99232 SBSQ HOSP IP/OBS MODERATE 35: CPT | Performed by: HOSPITALIST

## 2025-07-31 PROCEDURE — 25010000002 LIDOCAINE 1% - EPINEPHRINE 1:100000 1 %-1:100000 SOLUTION 20 ML VIAL: Performed by: SURGERY

## 2025-07-31 PROCEDURE — 47563 LAPARO CHOLECYSTECTOMY/GRAPH: CPT | Performed by: SPECIALIST/TECHNOLOGIST, OTHER

## 2025-07-31 PROCEDURE — 25010000002 FENTANYL CITRATE (PF) 50 MCG/ML SOLUTION: Performed by: NURSE ANESTHETIST, CERTIFIED REGISTERED

## 2025-07-31 PROCEDURE — 84484 ASSAY OF TROPONIN QUANT: CPT | Performed by: INTERNAL MEDICINE

## 2025-07-31 PROCEDURE — 25010000002 LIDOCAINE 2% SOLUTION: Performed by: NURSE ANESTHETIST, CERTIFIED REGISTERED

## 2025-07-31 PROCEDURE — 80053 COMPREHEN METABOLIC PANEL: CPT | Performed by: SURGERY

## 2025-07-31 PROCEDURE — 74300 X-RAY BILE DUCTS/PANCREAS: CPT

## 2025-07-31 DEVICE — LIGAMAX 5 MM ENDOSCOPIC MULTIPLE CLIP APPLIER
Type: IMPLANTABLE DEVICE | Site: ABDOMEN | Status: FUNCTIONAL
Brand: LIGAMAX

## 2025-07-31 RX ORDER — PROPOFOL 10 MG/ML
VIAL (ML) INTRAVENOUS AS NEEDED
Status: DISCONTINUED | OUTPATIENT
Start: 2025-07-31 | End: 2025-07-31 | Stop reason: SURG

## 2025-07-31 RX ORDER — MAGNESIUM HYDROXIDE 1200 MG/15ML
LIQUID ORAL AS NEEDED
Status: DISCONTINUED | OUTPATIENT
Start: 2025-07-31 | End: 2025-07-31 | Stop reason: HOSPADM

## 2025-07-31 RX ORDER — SODIUM CHLORIDE 0.9 % (FLUSH) 0.9 %
10 SYRINGE (ML) INJECTION EVERY 12 HOURS SCHEDULED
Status: DISCONTINUED | OUTPATIENT
Start: 2025-07-31 | End: 2025-07-31 | Stop reason: HOSPADM

## 2025-07-31 RX ORDER — ONDANSETRON 2 MG/ML
4 INJECTION INTRAMUSCULAR; INTRAVENOUS ONCE AS NEEDED
Status: DISCONTINUED | OUTPATIENT
Start: 2025-07-31 | End: 2025-07-31 | Stop reason: HOSPADM

## 2025-07-31 RX ORDER — KETOROLAC TROMETHAMINE 30 MG/ML
INJECTION, SOLUTION INTRAMUSCULAR; INTRAVENOUS AS NEEDED
Status: DISCONTINUED | OUTPATIENT
Start: 2025-07-31 | End: 2025-07-31 | Stop reason: SURG

## 2025-07-31 RX ORDER — SODIUM CHLORIDE 9 MG/ML
40 INJECTION, SOLUTION INTRAVENOUS AS NEEDED
Status: DISCONTINUED | OUTPATIENT
Start: 2025-07-31 | End: 2025-07-31 | Stop reason: HOSPADM

## 2025-07-31 RX ORDER — DEXAMETHASONE SODIUM PHOSPHATE 4 MG/ML
4 INJECTION, SOLUTION INTRA-ARTICULAR; INTRALESIONAL; INTRAMUSCULAR; INTRAVENOUS; SOFT TISSUE ONCE AS NEEDED
Status: COMPLETED | OUTPATIENT
Start: 2025-07-31 | End: 2025-07-31

## 2025-07-31 RX ORDER — FENTANYL CITRATE 50 UG/ML
INJECTION, SOLUTION INTRAMUSCULAR; INTRAVENOUS AS NEEDED
Status: DISCONTINUED | OUTPATIENT
Start: 2025-07-31 | End: 2025-07-31 | Stop reason: SURG

## 2025-07-31 RX ORDER — DROPERIDOL 2.5 MG/ML
1.25 INJECTION, SOLUTION INTRAMUSCULAR; INTRAVENOUS ONCE AS NEEDED
Status: DISCONTINUED | OUTPATIENT
Start: 2025-07-31 | End: 2025-07-31 | Stop reason: HOSPADM

## 2025-07-31 RX ORDER — FAMOTIDINE 10 MG/ML
20 INJECTION, SOLUTION INTRAVENOUS
Status: COMPLETED | OUTPATIENT
Start: 2025-07-31 | End: 2025-07-31

## 2025-07-31 RX ORDER — ROCURONIUM BROMIDE 10 MG/ML
INJECTION, SOLUTION INTRAVENOUS AS NEEDED
Status: DISCONTINUED | OUTPATIENT
Start: 2025-07-31 | End: 2025-07-31 | Stop reason: SURG

## 2025-07-31 RX ORDER — LIDOCAINE HYDROCHLORIDE 20 MG/ML
INJECTION, SOLUTION INFILTRATION; PERINEURAL AS NEEDED
Status: DISCONTINUED | OUTPATIENT
Start: 2025-07-31 | End: 2025-07-31 | Stop reason: SURG

## 2025-07-31 RX ORDER — SODIUM CHLORIDE 0.9 % (FLUSH) 0.9 %
10 SYRINGE (ML) INJECTION AS NEEDED
Status: DISCONTINUED | OUTPATIENT
Start: 2025-07-31 | End: 2025-07-31 | Stop reason: HOSPADM

## 2025-07-31 RX ORDER — LIDOCAINE HYDROCHLORIDE 10 MG/ML
0.5 INJECTION, SOLUTION EPIDURAL; INFILTRATION; INTRACAUDAL; PERINEURAL ONCE AS NEEDED
Status: DISCONTINUED | OUTPATIENT
Start: 2025-07-31 | End: 2025-07-31 | Stop reason: HOSPADM

## 2025-07-31 RX ORDER — FAMOTIDINE 20 MG/1
20 TABLET, FILM COATED ORAL
Status: COMPLETED | OUTPATIENT
Start: 2025-07-31 | End: 2025-07-31

## 2025-07-31 RX ORDER — SODIUM CHLORIDE, SODIUM LACTATE, POTASSIUM CHLORIDE, CALCIUM CHLORIDE 600; 310; 30; 20 MG/100ML; MG/100ML; MG/100ML; MG/100ML
100 INJECTION, SOLUTION INTRAVENOUS CONTINUOUS
Status: DISCONTINUED | OUTPATIENT
Start: 2025-07-31 | End: 2025-08-01 | Stop reason: HOSPADM

## 2025-07-31 RX ORDER — ONDANSETRON 2 MG/ML
4 INJECTION INTRAMUSCULAR; INTRAVENOUS ONCE AS NEEDED
Status: COMPLETED | OUTPATIENT
Start: 2025-07-31 | End: 2025-07-31

## 2025-07-31 RX ORDER — SODIUM CHLORIDE 9 MG/ML
INJECTION, SOLUTION INTRAVENOUS AS NEEDED
Status: DISCONTINUED | OUTPATIENT
Start: 2025-07-31 | End: 2025-07-31 | Stop reason: HOSPADM

## 2025-07-31 RX ORDER — SODIUM CHLORIDE, SODIUM LACTATE, POTASSIUM CHLORIDE, CALCIUM CHLORIDE 600; 310; 30; 20 MG/100ML; MG/100ML; MG/100ML; MG/100ML
30 INJECTION, SOLUTION INTRAVENOUS CONTINUOUS
Status: ACTIVE | OUTPATIENT
Start: 2025-07-31 | End: 2025-08-01

## 2025-07-31 RX ORDER — IOPAMIDOL 612 MG/ML
INJECTION, SOLUTION INTRAVASCULAR AS NEEDED
Status: DISCONTINUED | OUTPATIENT
Start: 2025-07-31 | End: 2025-07-31 | Stop reason: HOSPADM

## 2025-07-31 RX ADMIN — CEFAZOLIN 2000 MG: 2 INJECTION, POWDER, FOR SOLUTION INTRAVENOUS at 09:51

## 2025-07-31 RX ADMIN — ONDANSETRON 4 MG: 2 INJECTION, SOLUTION INTRAMUSCULAR; INTRAVENOUS at 09:00

## 2025-07-31 RX ADMIN — PHENYLEPHRINE HYDROCHLORIDE 100 MCG: 10 INJECTION INTRAVENOUS at 10:02

## 2025-07-31 RX ADMIN — FAMOTIDINE 20 MG: 10 INJECTION INTRAVENOUS at 09:00

## 2025-07-31 RX ADMIN — FENTANYL CITRATE 12.5 MCG: 50 INJECTION, SOLUTION INTRAMUSCULAR; INTRAVENOUS at 10:23

## 2025-07-31 RX ADMIN — FENTANYL CITRATE 25 MCG: 50 INJECTION, SOLUTION INTRAMUSCULAR; INTRAVENOUS at 11:00

## 2025-07-31 RX ADMIN — LIDOCAINE HYDROCHLORIDE 60 MG: 20 INJECTION, SOLUTION INFILTRATION; PERINEURAL at 09:58

## 2025-07-31 RX ADMIN — KETOROLAC TROMETHAMINE 15 MG: 30 INJECTION INTRAMUSCULAR; INTRAVENOUS at 10:55

## 2025-07-31 RX ADMIN — FENTANYL CITRATE 25 MCG: 50 INJECTION, SOLUTION INTRAMUSCULAR; INTRAVENOUS at 09:55

## 2025-07-31 RX ADMIN — SUGAMMADEX 200 MG: 100 INJECTION, SOLUTION INTRAVENOUS at 10:57

## 2025-07-31 RX ADMIN — PHENYLEPHRINE HYDROCHLORIDE 100 MCG: 10 INJECTION INTRAVENOUS at 10:13

## 2025-07-31 RX ADMIN — PROPOFOL 80 MG: 10 INJECTION, EMULSION INTRAVENOUS at 09:58

## 2025-07-31 RX ADMIN — PHENYLEPHRINE HYDROCHLORIDE 200 MCG: 10 INJECTION INTRAVENOUS at 10:04

## 2025-07-31 RX ADMIN — FENTANYL CITRATE 12.5 MCG: 50 INJECTION, SOLUTION INTRAMUSCULAR; INTRAVENOUS at 10:30

## 2025-07-31 RX ADMIN — ROCURONIUM BROMIDE 30 MG: 10 INJECTION INTRAVENOUS at 09:59

## 2025-07-31 RX ADMIN — DEXAMETHASONE SODIUM PHOSPHATE 4 MG: 4 INJECTION, SOLUTION INTRA-ARTICULAR; INTRALESIONAL; INTRAMUSCULAR; INTRAVENOUS; SOFT TISSUE at 09:00

## 2025-07-31 RX ADMIN — Medication 10 ML: at 20:38

## 2025-07-31 RX ADMIN — SODIUM CHLORIDE, POTASSIUM CHLORIDE, SODIUM LACTATE AND CALCIUM CHLORIDE 30 ML/HR: 600; 310; 30; 20 INJECTION, SOLUTION INTRAVENOUS at 08:59

## 2025-07-31 RX ADMIN — ATENOLOL 25 MG: 25 TABLET ORAL at 08:31

## 2025-07-31 NOTE — PROGRESS NOTES
"Hospitalist Team      Patient Care Team:  Aimee Duque DO as PCP - General (Family Medicine)        Chief Complaint:  Follow-up Hypertension    Subjective    Seen postprocedure.  Tolerated procedure well.  Family at bedside.  Ms. Sprague notes good pain control currently.    Objective    Vital Signs  Temp:  [97 °F (36.1 °C)-98.4 °F (36.9 °C)] 97.5 °F (36.4 °C)  Heart Rate:  [] 65  Resp:  [10-22] 17  BP: (111-179)/(52-75) 132/61  Oxygen Therapy  SpO2: 96 %  Pulse Oximetry Type: Continuous  Device (Oxygen Therapy): room air, nasal cannula with ETCO2  Flow (L/min) (Oxygen Therapy): 2  ETCO2 (mmHg): 21 mmHg}    Flowsheet Rows      Flowsheet Row First Filed Value   Admission Height 160 cm (63\") Documented at 07/29/2025 1238   Admission Weight 45.1 kg (99 lb 6.8 oz) Documented at 07/29/2025 1238            Physical Exam:    General: Appears in no acute distress.  HEENT: Pupils are equal and round and extraocular movements are intact.  Lungs: Breath sounds are clear throughout all fields.  Excursion is normal.  CV: Regular rate and rhythm.  No murmurs appreciated.  Radial pulses are 2+ and symmetric.  Abdomen: Dressings are intact.  Bowel sounds are diminished.  MSK: No clubbing, cyanosis, or edema.  Neuro: CN II-XII grossly intact.  Psych: Pleasant.    Results Review:     I reviewed the patient's new clinical results.    Lab Results (last 24 hours)       Procedure Component Value Units Date/Time    Comprehensive Metabolic Panel [[id number]]  (Abnormal) Collected: 07/31/25 0813    Specimen: Blood Updated: 07/31/25 1345     Glucose 93 mg/dL      BUN 3.7 mg/dL      Creatinine 0.68 mg/dL      Sodium 134 mmol/L      Potassium 4.1 mmol/L      Chloride 106 mmol/L      CO2 17.5 mmol/L      Calcium 8.4 mg/dL      Total Protein 4.8 g/dL      Albumin 2.8 g/dL      ALT (SGPT) 6 U/L      AST (SGOT) 22 U/L      Alkaline Phosphatase 108 U/L      Total Bilirubin 0.7 mg/dL      Globulin 2.0 gm/dL      A/G Ratio 1.4 g/dL      " BUN/Creatinine Ratio 5.4     Anion Gap 10.5 mmol/L      eGFR 86.0 mL/min/1.73     Narrative:      GFR Categories in Chronic Kidney Disease (CKD)              GFR Category          GFR (mL/min/1.73)    Interpretation  G1                    90 or greater        Normal or high (1)  G2                    60-89                Mild decrease (1)  G3a                   45-59                Mild to moderate decrease  G3b                   30-44                Moderate to severe decrease  G4                    15-29                Severe decrease  G5                    14 or less           Kidney failure    (1)In the absence of evidence of kidney disease, neither GFR category G1 or G2 fulfill the criteria for CKD.    eGFR calculation 2021 CKD-EPI creatinine equation, which does not include race as a factor    Tissue Pathology Exam [[id number]] Collected: 07/31/25 1047    Specimen: Tissue from Gallbladder Updated: 07/31/25 1138    High Sensitivity Troponin T 1Hr [[id number]]  (Abnormal) Collected: 07/31/25 0813    Specimen: Blood Updated: 07/31/25 0840     HS Troponin T 18 ng/L      Troponin T Numeric Delta 1 ng/L      Troponin T % Delta 6    Narrative:      High Sensitive Troponin T Reference Range:  <14.0 ng/L- Negative Female for AMI  <22.0 ng/L- Negative Male for AMI  >=14 - Abnormal Female indicating possible myocardial injury.  >=22 - Abnormal Male indicating possible myocardial injury.   Clinicians would have to utilize clinical acumen, EKG, Troponin, and serial changes to determine if it is an Acute Myocardial Infarction or myocardial injury due to an underlying chronic condition.         High Sensitivity Troponin T [[id number]]  (Abnormal) Collected: 07/30/25 1601    Specimen: Blood Updated: 07/30/25 1622     HS Troponin T 17 ng/L     Narrative:      High Sensitive Troponin T Reference Range:  <14.0 ng/L- Negative Female for AMI  <22.0 ng/L- Negative Male for AMI  >=14 - Abnormal Female indicating possible myocardial  injury.  >=22 - Abnormal Male indicating possible myocardial injury.   Clinicians would have to utilize clinical acumen, EKG, Troponin, and serial changes to determine if it is an Acute Myocardial Infarction or myocardial injury due to an underlying chronic condition.                 Imaging Results (Last 24 Hours)       Procedure Component Value Units Date/Time    FL Cholangiogram Operative [[id number]] Collected: 07/31/25 1059     Updated: 07/31/25 1104    Narrative:      FL CHOLANGIOGRAM OPERATIVE    Date of Exam: 7/31/2025 9:58 AM EDT    Indication: gallstones.    Comparison: None available.    Technique:  Digital spot images were obtained from an intraoperative cholangiogram procedure performed by the surgeon.    Fluoroscopic Time: 27 seconds    Findings:  Limited intraoperative fluoroscopic views were obtained for surgical support. The patient is status post cholecystectomy. Surgical clips are noted. There is contrast opacification of the common bile duct. No filling defects are seen in the duct to   indicate the presence of a stone. Contrast extends into the small bowel. There is no biliary obstruction. There is no evidence for extravasation of contrast.      Impression:      Impression:  Limited intraoperative fluoroscopic views for surgical support. Recommend correlation with real-time findings at the time of the procedure.      Electronically Signed: Jorge Galindo MD    7/31/2025 11:01 AM EDT    Workstation ID: [id number]            Medication Review:   I have reviewed the patient's current medication list    Current Facility-Administered Medications:     acetaminophen (TYLENOL) tablet 650 mg, 650 mg, Oral, Q4H PRN **OR** acetaminophen (TYLENOL) 160 MG/5ML oral solution 650 mg, 650 mg, Oral, Q4H PRN **OR** acetaminophen (TYLENOL) suppository 650 mg, 650 mg, Rectal, Q4H PRN, Perrentoby, Norah, DO    atenolol (TENORMIN) tablet 25 mg, 25 mg, Oral, Daily, PerNorah munguia DO, 25 mg at 07/31/25 9259     Calcium Replacement - Follow Nurse / BPA Driven Protocol, , Not Applicable, PRN, Perrenoud, Norah, DO    lactated ringers infusion, 30 mL/hr, Intravenous, Continuous, Perrenoud, Norah, DO, Last Rate: 30 mL/hr at 07/31/25 0859, Restarted at 07/31/25 0951    lactated ringers infusion, 100 mL/hr, Intravenous, Continuous, Ben Hendrix CRNA, Last Rate: 100 mL/hr at 07/31/25 1120, 100 mL/hr at 07/31/25 1120    Magnesium Standard Dose Replacement - Follow Nurse / BPA Driven Protocol, , Not Applicable, PRN, Perrenoud, Norah, DO    ondansetron (ZOFRAN) injection 4 mg, 4 mg, Intravenous, Q6H PRN, Perrenoud, Norah, DO    Phosphorus Replacement - Follow Nurse / BPA Driven Protocol, , Not Applicable, PRN, Perrenoud, Norah, DO    Potassium Replacement - Follow Nurse / BPA Driven Protocol, , Not Applicable, PRN, Perrenoud, Norah, DO    [COMPLETED] Insert Peripheral IV, , , Once **AND** sodium chloride 0.9 % flush 10 mL, 10 mL, Intravenous, PRN, Perrenoud, Norah, DO    sodium chloride 0.9 % flush 10 mL, 10 mL, Intravenous, Q12H, Perrenoud, Norah, DO    sodium chloride 0.9 % flush 10 mL, 10 mL, Intravenous, PRN, Perrenoud, Norah, DO    sodium chloride 0.9 % infusion 40 mL, 40 mL, Intravenous, PRN, Perrenoud, Norah, DO      Impressions/Recommendations:    Essential Hypertension: Continue atenolol in the perioperative period.  Dementia: Would recommend resuming Aricept at discharge.    Plan for disposition: As per Primary.    Miki Sandoval MD  07/31/25  15:38 EDT

## 2025-07-31 NOTE — ANESTHESIA PREPROCEDURE EVALUATION
Anesthesia Evaluation     Patient summary reviewed and Nursing notes reviewed   no history of anesthetic complications:   NPO Solid Status: > 8 hours  NPO Liquid Status: > 8 hours           Airway   Mallampati: II  TM distance: >3 FB  Neck ROM: full  Dental    (+) upper dentures    Pulmonary     breath sounds clear to auscultation  (+) a smoker Former,  Cardiovascular   Exercise tolerance: good (4-7 METS)    Patient on routine beta blocker and Beta blocker given within 24 hours of surgery  Rhythm: regular  Rate: normal    (+) hypertension well controlled, hyperlipidemia      Neuro/Psych  (+) dementia  GI/Hepatic/Renal/Endo      ROS Comment: H/O partial resection of colon d/t obstruction    Musculoskeletal     (+) back pain (back surgery)      ROS comment: Osteopenia  Abdominal    Substance History - negative use     OB/GYN          Other   arthritis,                 Anesthesia Plan    ASA 3     general     intravenous induction     Anesthetic plan, risks, benefits, and alternatives have been provided, discussed and informed consent has been obtained with: patient.    Use of blood products discussed with patient  Consented to blood products.      CODE STATUS:    Code Status (Patient has no pulse and is not breathing): CPR (Attempt to Resuscitate)  Medical Interventions (Patient has pulse or is breathing): Full Support

## 2025-07-31 NOTE — ANESTHESIA POSTPROCEDURE EVALUATION
Patient: Jennifer Sprague    Procedure Summary       Date: 07/31/25 Room / Location:  [facility] 2 /  [facility]    Anesthesia Start: 0951 Anesthesia Stop: 1112    Procedure: CHOLECYSTECTOMY LAPAROSCOPIC INTRAOPERATIVE CHOLANGIOGRAM (Abdomen) Diagnosis:       Gallstones      (Gallstones [K80.20])    Surgeons: Norah Payan DO Provider: Ben Hendrix CRNA    Anesthesia Type: general ASA Status: 3            Anesthesia Type: general    Vitals  Vitals Value Taken Time   /61 07/31/25 11:30   Temp 97.6 °F (36.4 °C) 07/31/25 11:13   Pulse 64 07/31/25 11:33   Resp 16 07/31/25 11:30   SpO2 99 % 07/31/25 11:33   Vitals shown include unfiled device data.        Post Anesthesia Care and Evaluation    Patient location during evaluation: PACU  Patient participation: complete - patient participated  Level of consciousness: awake  Pain management: adequate    Airway patency: patent  Anesthetic complications: No anesthetic complications  PONV Status: none  Cardiovascular status: acceptable  Respiratory status: acceptable  Hydration status: acceptable

## 2025-07-31 NOTE — OP NOTE
PREOPERATIVE DIAGNOSIS: Gallstones  POSTOPERATIVE DIAGNOSIS: Chronic cholecystitis with cholelithiasis and major intra-abdominal adhesions   PROCEDURE: Laparoscopic cholecystectomy with Intraoperative cholangiogram  SURGEON/STAFF: Schuyler  Assistant: Christi Boateng CSA was responsible for performing the following activities: Retraction, Closing, Placing Dressing, and Held/Positioned Camera and their skilled assistance was necessary for the success of this case.     SPECIMENS: Gallbladder.  INTRAOPERATIVE COMPLICATIONS: None.  ANESTHESIA: General.  BLOOD LOSS: Minimal  COUNTS: Needle and sponge counts correct.     Clinical Note: This very pleasant patient presented to my office with the aforementioned complaints.  Benefits and risks of a laparoscopic cholecystectomy with intraoperative cholangiogram possible open procedure were discussed.  Benefits and risks not limited to but including:Bleeding, infection, hernia formation, injury to intra-abdominal structures, bile leak, biloma, intra-abdominal abscess, DVT, PE, atelectasis pneumonia, anesthesia complications. She agreed and was consented for operative management without duress.     PROCEDURE: The patient was brought to the operating room in stable condition. Perioperative antibiotics were given and sequential compression devices applied. She was then laid supine on the operating room table. General anesthesia was induced by the Anesthesia Service without difficulty.     At this time, the patient's abdomen was accessed at the level of the umbilicus in open cutdown technique and insertion of a 12-mm trocar. The abdomen was then insufflated.  A camera was inserted revealing major intra-abdominal adhesions.  This was from her prior exploratory laparotomy with bowel resection.  I placed one 5 mm trocar under direct vision in an area where there were no adhesions.  Then using scissors we took down more adhesions until we could safely place another 5 mm trocar under  direct vision.  Once this was done we inserted the trocar under direct vision.    The peritoneal attachment of the gallbladder at the level of the infundibulum was scored from laterally to medially, terminating at the level of the hepatic edge. The peritoneum was gently stripped down, exposing the underlying cystic artery and cystic duct. This was also done on the lateral side of the gallbladder by reflecting the gallbladder medially. The peritoneal attachment here was again gently dissected inferiorly. After completely exposing the cystic duct as well as cystic artery, a retro-cystic window was created. These 2 structures, the cystic duct and cystic artery, were further delineated. A firm critical view was obtained, visualizing healthy-appearing hepatic tissue behind the 2 structures, with no evidence of looping, bridging or tenting of the common bile duct.     A clip was placed distally at the cystic duct and a cystic duct incision with laparoscopic scissors was performed. A percutaneous cholangio-catheter was inserted into the patient abdomen. The catheter was placed and secured into the cystic duct. Cholangiogram was performed that showed normal cystic duct, normal common hepatic duct, normal common bile duct with good spillage of dye into the duodenum with no evidence of any obstructions. The cholangiocatheter was removed and the distal portion of thecystic duct was then clipped twice and ligated.  The cystic artery was then triply clipped and ligated.  It was inspected and seen to be in intact. The gallbladder was then carefully dissected off the hepatic bed using hook electrocautery. It was firmly adherent to the underlying bed, and an effort careful and meticulous hemostasis was undertaken.  Once the gallbladder was removed and the hepatic bed, it was placed in the right upper quadrant and a 5 mm camera was placed into a 5 mm port.  There were major adhesions around the Guzman trocar.  These were taken down  carefully with scissors so we can carefully placed the Endo Catch bag into the abdomen.  Once adhesions were taken down the Endo Catch bag was placed into the abdomen and the gallbladder was placed into the Endo Catch bag.  It was removed through the umbilical trocar without difficulty.    Next, the umbilical trocar was reinserted into the abdomen and the liver bed was inspected. Hemostasis was perfected. Copious irrigation was carried out. The clips were intact and there was no bleeding or bile leakage noted.  The trocars were then removed under direct vision, air was deflated from the abdomen, and a Guzman trocar site fascia was closed with 0 Vicryl suture.  The incisions were then closed with 40 Vicryls suture. Sterile dressings were applied.    Anesthesia was reversed, the ET tube and OG tube were removed.  And the patient was taken into the recovery area in stable postoperative condition having tolerated the procedure well.    FEI Payan DO

## 2025-07-31 NOTE — ANESTHESIA PROCEDURE NOTES
Airway  Reason: elective    Date/Time: 7/31/2025 10:00 AM  Airway not difficult    General Information and Staff    Patient location during procedure: OR  CRNA/CAA: Ben Hendrix CRNA  SRNA: Tony Francois SRNA  Indications and Patient Condition  Indications for airway management: airway protection    Preoxygenated: yes    Mask difficulty assessment: 1 - vent by mask    Final Airway Details    Final airway type: endotracheal airway      Successful airway: ETT  Cuffed: yes   Successful intubation technique: direct laryngoscopy  Adjuncts used in placement: intubating stylet  Endotracheal tube insertion site: oral  Blade: Quintanilla  Blade size: 2  ETT size (mm): 7.0  Cormack-Lehane Classification: grade I - full view of glottis  Placement verified by: chest auscultation and capnometry   Measured from: teeth  ETT/EBT  to teeth (cm): 19  Number of attempts at approach: 1  Assessment: lips, teeth, and gum same as pre-op and atraumatic intubation

## 2025-07-31 NOTE — INTERVAL H&P NOTE
"  H&P reviewed. The patient was examined and there are no changes to the H&P.      /72 (BP Location: Left arm, Patient Position: Lying)   Pulse 65   Temp 98.4 °F (36.9 °C) (Oral)   Resp 22   Ht 160 cm (62.99\")   Wt 46 kg (101 lb 6.6 oz)   LMP  (LMP Unknown)   SpO2 96%   BMI 17.97 kg/m²         "

## 2025-07-31 NOTE — PLAN OF CARE
Goal Outcome Evaluation:  Plan of Care Reviewed With: patient        Progress: improving  Outcome Evaluation: vss. s/p lap alyssa. 3 lap sites, covered, c,d,i. bed alarm in place. history of dementia. pt alert to self only. advancing diet to regular. up with stand by assist. denies pain/n/v.

## 2025-08-01 ENCOUNTER — READMISSION MANAGEMENT (OUTPATIENT)
Dept: CALL CENTER | Facility: HOSPITAL | Age: 85
End: 2025-08-01
Payer: MEDICARE

## 2025-08-01 VITALS
BODY MASS INDEX: 17.97 KG/M2 | RESPIRATION RATE: 16 BRPM | HEIGHT: 63 IN | WEIGHT: 101.41 LBS | DIASTOLIC BLOOD PRESSURE: 67 MMHG | OXYGEN SATURATION: 95 % | HEART RATE: 87 BPM | SYSTOLIC BLOOD PRESSURE: 137 MMHG | TEMPERATURE: 97.5 F

## 2025-08-01 LAB
CYTO UR: NORMAL
LAB AP CASE REPORT: NORMAL
PATH REPORT.FINAL DX SPEC: NORMAL
PATH REPORT.GROSS SPEC: NORMAL

## 2025-08-01 PROCEDURE — A9270 NON-COVERED ITEM OR SERVICE: HCPCS | Performed by: SURGERY

## 2025-08-01 PROCEDURE — 63710000001 ATENOLOL 25 MG TABLET: Performed by: SURGERY

## 2025-08-01 PROCEDURE — G0378 HOSPITAL OBSERVATION PER HR: HCPCS

## 2025-08-01 PROCEDURE — 99231 SBSQ HOSP IP/OBS SF/LOW 25: CPT | Performed by: INTERNAL MEDICINE

## 2025-08-01 RX ADMIN — ATENOLOL 25 MG: 25 TABLET ORAL at 09:15

## 2025-08-01 RX ADMIN — Medication 10 ML: at 09:15

## 2025-08-01 NOTE — DISCHARGE INSTR - APPOINTMENTS
Patient has follow up with Dr. Duque on August 7 @ 1:30 pm [phone] Patients needs to arrive 15 mins early .

## 2025-08-01 NOTE — OUTREACH NOTE
Prep Survey      Flowsheet Row Responses   [facility] patient discharged from? [place]   Is LACE score < 7 ? Yes   Eligibility [facility]   Date of Admission 07/29/25   Date of Discharge 08/01/25   Discharge Disposition Home or Self Care   Discharge diagnosis Abdominal pain   Does the patient have one of the following disease processes/diagnoses(primary or secondary)? General Surgery   Does the patient have Home health ordered? No   Is there a DME ordered? No   Prep survey completed? Yes            Tejal VICKERS - Registered Nurse

## 2025-08-01 NOTE — PROGRESS NOTES
LOS: 1 day   Patient Care Team:  Aimee Duque DO as PCP - General (Family Medicine)    Chief Complaint:     F/u abnormal ECG    Interval History:       She has no chest pain or difficulty breathing.  She does not feel her heart racing or skipping.    Echo 7/30/25    Interpretation Summary         Left ventricular systolic function is hyperdynamic (EF > 70%). Calculated left ventricular EF = 74.6%    Left ventricular diastolic function was normal.    Objective   Vital Signs  Temp:  [97 °F (36.1 °C)-98.4 °F (36.9 °C)] 97.5 °F (36.4 °C)  Heart Rate:  [62-93] 87  Resp:  [10-22] 16  BP: (123-152)/(52-73) 137/67    Intake/Output Summary (Last 24 hours) at 8/1/2025 0747  Last data filed at 8/1/2025 0155  Gross per 24 hour   Intake 940 ml   Output 210 ml   Net 730 ml       Comfortable NAD  Neck supple, no JVD or thyromegaly appreciated  S1/S2 RRR, no m/r/g  Lungs CTA B, normal effort  Abdomen S/NT/ND (+) BS, no HSM appreciated  Extremities warm, no clubbing, cyanosis, or edema  No visible or palpable skin lesions  A/Ox4, mood and affect appropriate    Results Review:      Results from last 7 days   Lab Units 07/31/25  0813 07/30/25 0345 07/29/25  1301   SODIUM mmol/L 134* 135* 134*   POTASSIUM mmol/L 4.1 4.0 3.8   CHLORIDE mmol/L 106 107 101   CO2 mmol/L 17.5* 17.9* 21.5*   BUN mg/dL 3.7* 5.7* 7.9*   CREATININE mg/dL 0.68 0.74 0.77   GLUCOSE mg/dL 93 112* 114*   CALCIUM mg/dL 8.4* 7.9* 8.6     Results from last 7 days   Lab Units 07/31/25  0813 07/30/25  1601 07/30/25 0345   HSTROP T ng/L 18* 17* 21*     Results from last 7 days   Lab Units 07/30/25  0345 07/29/25  1301   WBC 10*3/mm3 4.16 7.48   HEMOGLOBIN g/dL 15.6 13.7   HEMATOCRIT % 46.3 42.6   PLATELETS 10*3/mm3 113* 202                       I reviewed the patient's new clinical results.  I personally viewed and interpreted the patient's EKG/Telemetry data        Medication Review:   atenolol, 25 mg, Oral, Daily  sodium chloride, 10 mL, Intravenous,  Q12H        lactated ringers, 30 mL/hr, Last Rate: 30 mL/hr (07/31/25 0859)  lactated ringers, 100 mL/hr, Last Rate: 100 mL/hr (07/31/25 1120)        Assessment & Plan       Abdominal pain    Gallstones    Moderate protein-calorie malnutrition    S/p lap alyssa  Abnormal ECG, normal echo.   Small pericardial effusion noted on echo.     No follow-up needed, can follow-up with her PCP about abnormal ECG.  We will sign off.    Shreya Madsen MD  08/01/25  07:47 EDT

## 2025-08-01 NOTE — CASE MANAGEMENT/SOCIAL WORK
Continued Stay Note  PAMELLA Lopez     Patient Name: Jennifer Sprague  MRN: [id number]  Today's Date: 8/1/2025    Admit Date: 7/29/2025    Plan: home with  and family support   Discharge Plan       Row Name 08/01/25 0820       Plan    Plan home with  and family support    Patient/Family in Agreement with Plan yes    Plan Comments pt/ family deny CCP needs and are ready for dc today. Renata MORRIS RN/ CCP                   Discharge Codes    No documentation.                 Expected Discharge Date and Time       Expected Discharge Date Expected Discharge Time    Aug 1, 2025               Renata Samuel RN

## 2025-08-01 NOTE — DISCHARGE SUMMARY
Discharge Summary    Patient name: Jennifer Sprague    Medical record number: [id number]    Admission date: 7/29/2025  Discharge date:      Attending physician: Dr. FEI Payan    Primary care physician: Aimee Duque DO    Consulting physician(s): Cardiology and internal medicine    Pertinent Test Results: None    Condition on discharge: stable    Primary Diagnoses: Chronic cholecystitis with cholelithiasis    Operative Procedure: Laparoscopic cholecystectomy    Hospital Course: The patient is a very pleasant 84 y.o. female that was admitted to the hospital with failure to thrive and nausea.  She had known gallstone disease and was already scheduled to have her gallbladder removed.  She was admitted and internal medicine was consulted for medical management.  She did have an abnormal EKG so cardiology was consulted for preoperative clearance.  She tolerated her laparoscopic cholecystectomy very well.  She was discharged on postoperative day 1 after tolerating a regular diet and having good pain control.    Discharge medications:      Discharge Medications        Continue These Medications        Instructions Start Date   atenolol 25 MG tablet  Commonly known as: TENORMIN   25 mg, Oral, Daily               Discharge instructions:      No driving for 1 week and when no longer taking narcotics.  You may ride in a car  Diet as tolerated  You may walk as tolerated  You may walk up and down stairs as tolerated  Remove your dressings in 2 days  You may shower  You have a special glue covering your incisions that will wear off at time  No lifting greater than 10 pounds for 2 weeks  If you develop constipation, take a tablespoon of Milk of Magnesia once a day as needed  You may place ice packs on your incisions for 20 minutes at a time as needed for pain   You may alternate Tylenol and ibuprofen as directed for pain control    Follow-up appointment: Follow up with Dr. Payan in the office in 2 weeks. Call for  questions at [phone].

## 2025-08-01 NOTE — PLAN OF CARE
Goal Outcome Evaluation:  Plan of Care Reviewed With: patient        Progress: improving  Outcome Evaluation: VSS, POD #1 lap choley, dressings clean/dry/intact, patient alert to self only, denies pain and has no s/s of pain, ambulating well with standby assist, safety measures in place, call light within reach, anticipate home with family at discharge.

## 2025-08-01 NOTE — CASE MANAGEMENT/SOCIAL WORK
Case Management Discharge Note      Final Note: home with  and family support         Selected Continued Care - Discharged on 8/1/2025 Admission date: 7/29/2025 - Discharge disposition: Home or Self Care      Destination    No services have been selected for the patient.                Durable Medical Equipment    No services have been selected for the patient.                Dialysis/Infusion    No services have been selected for the patient.                Home Medical Care    No services have been selected for the patient.                Therapy    No services have been selected for the patient.                Community Resources    No services have been selected for the patient.                Community & DME    No services have been selected for the patient.                         Final Discharge Disposition Code: 01 - home or self-care

## 2025-08-04 ENCOUNTER — TRANSITIONAL CARE MANAGEMENT TELEPHONE ENCOUNTER (OUTPATIENT)
Dept: CALL CENTER | Facility: HOSPITAL | Age: 85
End: 2025-08-04
Payer: MEDICARE

## 2025-08-07 ENCOUNTER — OFFICE VISIT (OUTPATIENT)
Dept: FAMILY MEDICINE CLINIC | Facility: CLINIC | Age: 85
End: 2025-08-07
Payer: MEDICARE

## 2025-08-07 VITALS
HEART RATE: 77 BPM | OXYGEN SATURATION: 97 % | HEIGHT: 63 IN | SYSTOLIC BLOOD PRESSURE: 120 MMHG | DIASTOLIC BLOOD PRESSURE: 74 MMHG | WEIGHT: 101.6 LBS | BODY MASS INDEX: 18 KG/M2

## 2025-08-07 DIAGNOSIS — Z09 HOSPITAL DISCHARGE FOLLOW-UP: Primary | ICD-10-CM

## 2025-08-07 DIAGNOSIS — E53.8 B12 DEFICIENCY: ICD-10-CM

## 2025-08-07 DIAGNOSIS — F03.B0 MODERATE DEMENTIA WITHOUT BEHAVIORAL DISTURBANCE, PSYCHOTIC DISTURBANCE, MOOD DISTURBANCE, OR ANXIETY, UNSPECIFIED DEMENTIA TYPE: ICD-10-CM

## 2025-08-07 RX ORDER — CYANOCOBALAMIN 1000 UG/ML
1000 INJECTION, SOLUTION INTRAMUSCULAR; SUBCUTANEOUS ONCE
Status: COMPLETED | OUTPATIENT
Start: 2025-08-07 | End: 2025-08-07

## 2025-08-07 RX ADMIN — CYANOCOBALAMIN 1000 MCG: 1000 INJECTION, SOLUTION INTRAMUSCULAR; SUBCUTANEOUS at 14:15

## 2025-08-15 ENCOUNTER — OFFICE VISIT (OUTPATIENT)
Dept: SURGERY | Facility: CLINIC | Age: 85
End: 2025-08-15
Payer: MEDICARE

## 2025-08-15 DIAGNOSIS — Z90.49 STATUS POST LAPAROSCOPIC CHOLECYSTECTOMY: Primary | ICD-10-CM

## 2025-08-15 PROBLEM — K80.20 GALLSTONES: Status: RESOLVED | Noted: 2025-07-23 | Resolved: 2025-08-15

## 2025-08-15 PROBLEM — R10.9 ABDOMINAL PAIN: Status: RESOLVED | Noted: 2025-07-29 | Resolved: 2025-08-15

## 2025-08-15 PROCEDURE — 99024 POSTOP FOLLOW-UP VISIT: CPT | Performed by: SURGERY

## 2025-08-15 PROCEDURE — 1160F RVW MEDS BY RX/DR IN RCRD: CPT | Performed by: SURGERY

## 2025-08-15 PROCEDURE — 1159F MED LIST DOCD IN RCRD: CPT | Performed by: SURGERY

## (undated) DEVICE — PK LAP GEN 90

## (undated) DEVICE — DRSNG SURESITE WNDW 4X4.5

## (undated) DEVICE — LAPAROSCOPIC SMOKE FILTRATION SYSTEM: Brand: PALL LAPAROSHIELD® PLUS LAPAROSCOPIC SMOKE FILTRATION SYSTEM

## (undated) DEVICE — INSUFFLATION TUBING SET, ENDOFLATOR 50: Brand: N.A.

## (undated) DEVICE — ENDOPOUCH RETRIEVER SPECIMEN RETRIEVAL BAGS: Brand: ENDOPOUCH RETRIEVER

## (undated) DEVICE — 2, DISPOSABLE SUCTION/IRRIGATOR WITH DISPOSABLE TIP: Brand: STRYKEFLOW

## (undated) DEVICE — TRANSFER SET 3": Brand: MEDLINE INDUSTRIES, INC.

## (undated) DEVICE — SYR LUERLOK 20CC BX/50

## (undated) DEVICE — TRAP FLD MINIVAC MEGADYNE 100ML

## (undated) DEVICE — LINER SURG CANSTR SXN S/RIGD 1500CC

## (undated) DEVICE — SOL IRR H2O BO 1000ML STRL

## (undated) DEVICE — ENDOPATH XCEL UNIVERSAL TROCAR STABLILITY SLEEVES: Brand: ENDOPATH XCEL

## (undated) DEVICE — TROCARS: Brand: KII® BALLOON BLUNT TIP SYSTEM

## (undated) DEVICE — SET CATH CHOLANG REDK W/SCOOP TIP INTRO 4F 50CM

## (undated) DEVICE — CONTAINER,SPECIMEN,OR STERILE,4OZ: Brand: MEDLINE

## (undated) DEVICE — DRSNG SURESITE WNDW 2.38X2.75

## (undated) DEVICE — DRAPE,REIN 53X77,STERILE: Brand: MEDLINE

## (undated) DEVICE — MICRO HVTSA, 0.5G AND HVTSA SOURCEMARK PRODUCT CODE M1206 AND M1206-01: Brand: EXOFIN MICRO HVTSA, 0.5G

## (undated) DEVICE — ANTIBACTERIAL UNDYED BRAIDED (POLYGLACTIN 910), SYNTHETIC ABSORBABLE SUTURE: Brand: COATED VICRYL

## (undated) DEVICE — DRP C/ARM 41X74IN

## (undated) DEVICE — GLV SURG SENSICARE W/ALOE PF LF 6.5 STRL

## (undated) DEVICE — METZENBAUM SCISSOR TIP, DISPOSABLE: Brand: RENEW

## (undated) DEVICE — APPL CHLORAPREP HI/LITE 26ML ORNG

## (undated) DEVICE — PAD,NON-ADHERENT,3X8,STERILE,LF,1/PK: Brand: MEDLINE

## (undated) DEVICE — SUT VIC 0 UR6 27IN VCP603H

## (undated) DEVICE — LAPAROSCOPIC SCOPE WARMER: Brand: DEROYAL

## (undated) DEVICE — SYR LUERLOK 30CC

## (undated) DEVICE — ENDOPATH XCEL BLADELESS TROCARS WITH STABILITY SLEEVES: Brand: ENDOPATH XCEL

## (undated) DEVICE — PENCL SMOKE/EVAC MEGADYNE TELESCP 10FT

## (undated) DEVICE — DECANTER BAG 9": Brand: MEDLINE INDUSTRIES, INC.